# Patient Record
Sex: FEMALE | Race: WHITE | NOT HISPANIC OR LATINO | Employment: OTHER | URBAN - METROPOLITAN AREA
[De-identification: names, ages, dates, MRNs, and addresses within clinical notes are randomized per-mention and may not be internally consistent; named-entity substitution may affect disease eponyms.]

---

## 2021-07-13 ENCOUNTER — CLINICAL SUPPORT (OUTPATIENT)
Dept: URGENT CARE | Facility: CLINIC | Age: 61
End: 2021-07-13
Payer: COMMERCIAL

## 2021-07-13 DIAGNOSIS — Z11.52 ENCOUNTER FOR SCREENING LABORATORY TESTING FOR COVID-19 VIRUS: Primary | ICD-10-CM

## 2021-07-13 LAB
CTP QC/QA: YES
SARS-COV-2 RDRP RESP QL NAA+PROBE: NEGATIVE

## 2021-07-13 PROCEDURE — U0002 COVID-19 LAB TEST NON-CDC: HCPCS | Mod: QW,S$GLB,, | Performed by: PHYSICIAN ASSISTANT

## 2021-07-13 PROCEDURE — U0002: ICD-10-PCS | Mod: QW,S$GLB,, | Performed by: PHYSICIAN ASSISTANT

## 2022-02-22 ENCOUNTER — HOSPITAL ENCOUNTER (INPATIENT)
Facility: HOSPITAL | Age: 62
LOS: 2 days | Discharge: HOME | DRG: 246 | End: 2022-02-24
Attending: EMERGENCY MEDICINE | Admitting: INTERNAL MEDICINE
Payer: COMMERCIAL

## 2022-02-22 DIAGNOSIS — I21.4 NSTEMI (NON-ST ELEVATED MYOCARDIAL INFARCTION): Primary | ICD-10-CM

## 2022-02-22 DIAGNOSIS — R07.9 CHEST PAIN: ICD-10-CM

## 2022-02-22 PROBLEM — R64 CACHEXIA: Chronic | Status: ACTIVE | Noted: 2022-02-22

## 2022-02-22 PROBLEM — F12.20 MARIJUANA DEPENDENCE: Chronic | Status: ACTIVE | Noted: 2022-02-22

## 2022-02-22 PROBLEM — Z72.0 TOBACCO ABUSE: Chronic | Status: ACTIVE | Noted: 2022-02-22

## 2022-02-22 LAB
ALBUMIN SERPL BCP-MCNC: 3.5 G/DL (ref 3.5–5.2)
ALP SERPL-CCNC: 61 U/L (ref 55–135)
ALT SERPL W/O P-5'-P-CCNC: 11 U/L (ref 10–44)
ANION GAP SERPL CALC-SCNC: 11 MMOL/L (ref 8–16)
APTT BLDCRRT: 25.4 SEC (ref 21–32)
AST SERPL-CCNC: 22 U/L (ref 10–40)
BASOPHILS # BLD AUTO: 0.06 K/UL (ref 0–0.2)
BASOPHILS NFR BLD: 0.4 % (ref 0–1.9)
BILIRUB SERPL-MCNC: 0.9 MG/DL (ref 0.1–1)
BNP SERPL-MCNC: 171 PG/ML (ref 0–99)
BUN SERPL-MCNC: 16 MG/DL (ref 8–23)
CALCIUM SERPL-MCNC: 9.7 MG/DL (ref 8.7–10.5)
CHLORIDE SERPL-SCNC: 104 MMOL/L (ref 95–110)
CO2 SERPL-SCNC: 23 MMOL/L (ref 23–29)
CREAT SERPL-MCNC: 0.6 MG/DL (ref 0.5–1.4)
CTP QC/QA: YES
DIFFERENTIAL METHOD: ABNORMAL
EOSINOPHIL # BLD AUTO: 0 K/UL (ref 0–0.5)
EOSINOPHIL NFR BLD: 0.2 % (ref 0–8)
ERYTHROCYTE [DISTWIDTH] IN BLOOD BY AUTOMATED COUNT: 12.5 % (ref 11.5–14.5)
EST. GFR  (AFRICAN AMERICAN): >60 ML/MIN/1.73 M^2
EST. GFR  (NON AFRICAN AMERICAN): >60 ML/MIN/1.73 M^2
GLUCOSE SERPL-MCNC: 142 MG/DL (ref 70–110)
HCT VFR BLD AUTO: 39.3 % (ref 37–48.5)
HGB BLD-MCNC: 13.1 G/DL (ref 12–16)
IMM GRANULOCYTES # BLD AUTO: 0.09 K/UL (ref 0–0.04)
IMM GRANULOCYTES NFR BLD AUTO: 0.6 % (ref 0–0.5)
LIPASE SERPL-CCNC: 35 U/L (ref 4–60)
LYMPHOCYTES # BLD AUTO: 1.2 K/UL (ref 1–4.8)
LYMPHOCYTES NFR BLD: 8.4 % (ref 18–48)
MCH RBC QN AUTO: 29.6 PG (ref 27–31)
MCHC RBC AUTO-ENTMCNC: 33.3 G/DL (ref 32–36)
MCV RBC AUTO: 89 FL (ref 82–98)
MONOCYTES # BLD AUTO: 0.4 K/UL (ref 0.3–1)
MONOCYTES NFR BLD: 2.9 % (ref 4–15)
NEUTROPHILS # BLD AUTO: 12.8 K/UL (ref 1.8–7.7)
NEUTROPHILS NFR BLD: 87.5 % (ref 38–73)
NRBC BLD-RTO: 0 /100 WBC
PLATELET # BLD AUTO: 382 K/UL (ref 150–450)
PMV BLD AUTO: 10 FL (ref 9.2–12.9)
POTASSIUM SERPL-SCNC: 4.3 MMOL/L (ref 3.5–5.1)
PROT SERPL-MCNC: 7.3 G/DL (ref 6–8.4)
RBC # BLD AUTO: 4.42 M/UL (ref 4–5.4)
SARS-COV-2 RDRP RESP QL NAA+PROBE: NEGATIVE
SODIUM SERPL-SCNC: 138 MMOL/L (ref 136–145)
TROPONIN I SERPL DL<=0.01 NG/ML-MCNC: 2.46 NG/ML (ref 0–0.03)
TSH SERPL DL<=0.005 MIU/L-ACNC: 0.93 UIU/ML (ref 0.4–4)
WBC # BLD AUTO: 14.58 K/UL (ref 3.9–12.7)

## 2022-02-22 PROCEDURE — 63600175 PHARM REV CODE 636 W HCPCS: Performed by: INTERNAL MEDICINE

## 2022-02-22 PROCEDURE — 63600175 PHARM REV CODE 636 W HCPCS: Performed by: PHYSICIAN ASSISTANT

## 2022-02-22 PROCEDURE — 25500020 PHARM REV CODE 255: Performed by: INTERNAL MEDICINE

## 2022-02-22 PROCEDURE — 93005 ELECTROCARDIOGRAM TRACING: CPT

## 2022-02-22 PROCEDURE — 25000003 PHARM REV CODE 250: Performed by: INTERNAL MEDICINE

## 2022-02-22 PROCEDURE — 92929 PR STENT, ADD'L VESSEL: ICD-10-PCS | Mod: LC,,, | Performed by: INTERNAL MEDICINE

## 2022-02-22 PROCEDURE — 99291 PR CRITICAL CARE, E/M 30-74 MINUTES: ICD-10-PCS | Mod: CS,,, | Performed by: EMERGENCY MEDICINE

## 2022-02-22 PROCEDURE — C1887 CATHETER, GUIDING: HCPCS | Performed by: INTERNAL MEDICINE

## 2022-02-22 PROCEDURE — 99223 1ST HOSP IP/OBS HIGH 75: CPT | Mod: 25,,, | Performed by: INTERNAL MEDICINE

## 2022-02-22 PROCEDURE — 99291 CRITICAL CARE FIRST HOUR: CPT | Mod: 25

## 2022-02-22 PROCEDURE — 93010 EKG 12-LEAD: ICD-10-PCS | Mod: 76,,, | Performed by: INTERNAL MEDICINE

## 2022-02-22 PROCEDURE — 96374 THER/PROPH/DIAG INJ IV PUSH: CPT

## 2022-02-22 PROCEDURE — 85730 THROMBOPLASTIN TIME PARTIAL: CPT | Performed by: INTERNAL MEDICINE

## 2022-02-22 PROCEDURE — C1874 STENT, COATED/COV W/DEL SYS: HCPCS | Performed by: INTERNAL MEDICINE

## 2022-02-22 PROCEDURE — 93458 L HRT ARTERY/VENTRICLE ANGIO: CPT | Mod: 26,51,59, | Performed by: INTERNAL MEDICINE

## 2022-02-22 PROCEDURE — 99153 MOD SED SAME PHYS/QHP EA: CPT | Performed by: INTERNAL MEDICINE

## 2022-02-22 PROCEDURE — 83690 ASSAY OF LIPASE: CPT | Performed by: EMERGENCY MEDICINE

## 2022-02-22 PROCEDURE — 92929 PR STENT, ADD'L VESSEL: CPT | Mod: LC,,, | Performed by: INTERNAL MEDICINE

## 2022-02-22 PROCEDURE — 92941 PR AMI ANY METHOD: ICD-10-PCS | Mod: LD,,, | Performed by: INTERNAL MEDICINE

## 2022-02-22 PROCEDURE — 93010 ELECTROCARDIOGRAM REPORT: CPT | Mod: ,,, | Performed by: INTERNAL MEDICINE

## 2022-02-22 PROCEDURE — U0002 COVID-19 LAB TEST NON-CDC: HCPCS | Performed by: EMERGENCY MEDICINE

## 2022-02-22 PROCEDURE — C1725 CATH, TRANSLUMIN NON-LASER: HCPCS | Performed by: INTERNAL MEDICINE

## 2022-02-22 PROCEDURE — 93010 EKG 12-LEAD: ICD-10-PCS | Mod: ,,, | Performed by: INTERNAL MEDICINE

## 2022-02-22 PROCEDURE — C9600 PERC DRUG-EL COR STENT SING: HCPCS | Mod: LC | Performed by: INTERNAL MEDICINE

## 2022-02-22 PROCEDURE — 85025 COMPLETE CBC W/AUTO DIFF WBC: CPT | Performed by: EMERGENCY MEDICINE

## 2022-02-22 PROCEDURE — 92941 PRQ TRLML REVSC TOT OCCL AMI: CPT | Mod: LD,,, | Performed by: INTERNAL MEDICINE

## 2022-02-22 PROCEDURE — C9606 PERC D-E COR REVASC W AMI S: HCPCS | Mod: LD | Performed by: INTERNAL MEDICINE

## 2022-02-22 PROCEDURE — 25000003 PHARM REV CODE 250: Performed by: PHYSICIAN ASSISTANT

## 2022-02-22 PROCEDURE — 84484 ASSAY OF TROPONIN QUANT: CPT | Performed by: EMERGENCY MEDICINE

## 2022-02-22 PROCEDURE — 93458 PR CATH PLACE/CORON ANGIO, IMG SUPER/INTERP,W LEFT HEART VENTRICULOGRAPHY: ICD-10-PCS | Mod: 26,51,59, | Performed by: INTERNAL MEDICINE

## 2022-02-22 PROCEDURE — C1769 GUIDE WIRE: HCPCS | Performed by: INTERNAL MEDICINE

## 2022-02-22 PROCEDURE — 36415 COLL VENOUS BLD VENIPUNCTURE: CPT | Performed by: HOSPITALIST

## 2022-02-22 PROCEDURE — 84443 ASSAY THYROID STIM HORMONE: CPT | Performed by: HOSPITALIST

## 2022-02-22 PROCEDURE — 99152 MOD SED SAME PHYS/QHP 5/>YRS: CPT | Performed by: INTERNAL MEDICINE

## 2022-02-22 PROCEDURE — 99223 1ST HOSP IP/OBS HIGH 75: CPT | Mod: ,,, | Performed by: INTERNAL MEDICINE

## 2022-02-22 PROCEDURE — 80053 COMPREHEN METABOLIC PANEL: CPT | Performed by: EMERGENCY MEDICINE

## 2022-02-22 PROCEDURE — 27201423 OPTIME MED/SURG SUP & DEVICES STERILE SUPPLY: Performed by: INTERNAL MEDICINE

## 2022-02-22 PROCEDURE — 93458 L HRT ARTERY/VENTRICLE ANGIO: CPT | Mod: 51,59 | Performed by: INTERNAL MEDICINE

## 2022-02-22 PROCEDURE — 20600001 HC STEP DOWN PRIVATE ROOM

## 2022-02-22 PROCEDURE — 25000242 PHARM REV CODE 250 ALT 637 W/ HCPCS: Performed by: PHYSICIAN ASSISTANT

## 2022-02-22 PROCEDURE — 99223 PR INITIAL HOSPITAL CARE,LEVL III: ICD-10-PCS | Mod: ,,, | Performed by: INTERNAL MEDICINE

## 2022-02-22 PROCEDURE — 99152 MOD SED SAME PHYS/QHP 5/>YRS: CPT | Mod: ,,, | Performed by: INTERNAL MEDICINE

## 2022-02-22 PROCEDURE — 83880 ASSAY OF NATRIURETIC PEPTIDE: CPT | Performed by: EMERGENCY MEDICINE

## 2022-02-22 PROCEDURE — 93010 ELECTROCARDIOGRAM REPORT: CPT | Mod: 76,,, | Performed by: INTERNAL MEDICINE

## 2022-02-22 PROCEDURE — C1894 INTRO/SHEATH, NON-LASER: HCPCS | Performed by: INTERNAL MEDICINE

## 2022-02-22 PROCEDURE — 99152 PR MOD CONSCIOUS SEDATION, SAME PHYS, 5+ YRS, FIRST 15 MIN: ICD-10-PCS | Mod: ,,, | Performed by: INTERNAL MEDICINE

## 2022-02-22 PROCEDURE — 99291 CRITICAL CARE FIRST HOUR: CPT | Mod: CS,,, | Performed by: EMERGENCY MEDICINE

## 2022-02-22 PROCEDURE — 99223 PR INITIAL HOSPITAL CARE,LEVL III: ICD-10-PCS | Mod: 25,,, | Performed by: INTERNAL MEDICINE

## 2022-02-22 DEVICE — STENT RONYX30008UX RESOLUTE ONYX 3.00X08
Type: IMPLANTABLE DEVICE | Site: CORONARY | Status: FUNCTIONAL
Brand: RESOLUTE ONYX™

## 2022-02-22 DEVICE — STENT RONYX40022UX RESOLUTE ONYX 4.00X22
Type: IMPLANTABLE DEVICE | Site: CORONARY | Status: FUNCTIONAL
Brand: RESOLUTE ONYX™

## 2022-02-22 RX ORDER — NITROGLYCERIN 0.4 MG/1
0.4 TABLET SUBLINGUAL
Status: COMPLETED | OUTPATIENT
Start: 2022-02-22 | End: 2022-02-22

## 2022-02-22 RX ORDER — ONDANSETRON 2 MG/ML
INJECTION INTRAMUSCULAR; INTRAVENOUS
Status: DISCONTINUED | OUTPATIENT
Start: 2022-02-22 | End: 2022-02-24 | Stop reason: HOSPADM

## 2022-02-22 RX ORDER — HEPARIN SOD,PORCINE/0.9 % NACL 1000/500ML
INTRAVENOUS SOLUTION INTRAVENOUS
Status: DISCONTINUED | OUTPATIENT
Start: 2022-02-22 | End: 2022-02-24 | Stop reason: HOSPADM

## 2022-02-22 RX ORDER — METOPROLOL TARTRATE 25 MG/1
25 TABLET, FILM COATED ORAL 2 TIMES DAILY
Status: DISCONTINUED | OUTPATIENT
Start: 2022-02-22 | End: 2022-02-24 | Stop reason: HOSPADM

## 2022-02-22 RX ORDER — NITROGLYCERIN 0.4 MG/1
0.4 TABLET SUBLINGUAL EVERY 5 MIN PRN
Status: DISCONTINUED | OUTPATIENT
Start: 2022-02-22 | End: 2022-02-24 | Stop reason: HOSPADM

## 2022-02-22 RX ORDER — NITROGLYCERIN 400 UG/1
SPRAY ORAL
Status: DISCONTINUED | OUTPATIENT
Start: 2022-02-22 | End: 2022-02-24 | Stop reason: HOSPADM

## 2022-02-22 RX ORDER — LOSARTAN POTASSIUM 25 MG/1
25 TABLET ORAL DAILY
Status: DISCONTINUED | OUTPATIENT
Start: 2022-02-22 | End: 2022-02-23

## 2022-02-22 RX ORDER — CLOPIDOGREL 300 MG/1
600 TABLET, FILM COATED ORAL ONCE
Status: COMPLETED | OUTPATIENT
Start: 2022-02-22 | End: 2022-02-22

## 2022-02-22 RX ORDER — SODIUM CHLORIDE 9 MG/ML
INJECTION, SOLUTION INTRAVENOUS ONCE
Status: CANCELLED | OUTPATIENT
Start: 2022-02-22 | End: 2022-02-22

## 2022-02-22 RX ORDER — METOPROLOL TARTRATE 25 MG/1
25 TABLET, FILM COATED ORAL 2 TIMES DAILY
Status: DISCONTINUED | OUTPATIENT
Start: 2022-02-22 | End: 2022-02-23 | Stop reason: HOSPADM

## 2022-02-22 RX ORDER — ONDANSETRON 4 MG/1
8 TABLET, ORALLY DISINTEGRATING ORAL EVERY 8 HOURS PRN
Status: DISCONTINUED | OUTPATIENT
Start: 2022-02-22 | End: 2022-02-23

## 2022-02-22 RX ORDER — NITROGLYCERIN 5 MG/ML
INJECTION, SOLUTION INTRAVENOUS
Status: DISCONTINUED | OUTPATIENT
Start: 2022-02-22 | End: 2022-02-24 | Stop reason: HOSPADM

## 2022-02-22 RX ORDER — ATORVASTATIN CALCIUM 20 MG/1
80 TABLET, FILM COATED ORAL DAILY
Status: DISCONTINUED | OUTPATIENT
Start: 2022-02-23 | End: 2022-02-24 | Stop reason: HOSPADM

## 2022-02-22 RX ORDER — SODIUM CHLORIDE 9 MG/ML
INJECTION, SOLUTION INTRAVENOUS
Status: DISCONTINUED | OUTPATIENT
Start: 2022-02-22 | End: 2022-02-24 | Stop reason: HOSPADM

## 2022-02-22 RX ORDER — NAPROXEN SODIUM 220 MG/1
81 TABLET, FILM COATED ORAL DAILY
Status: DISCONTINUED | OUTPATIENT
Start: 2022-02-23 | End: 2022-02-24 | Stop reason: HOSPADM

## 2022-02-22 RX ORDER — CLOPIDOGREL BISULFATE 75 MG/1
75 TABLET ORAL DAILY
Status: DISCONTINUED | OUTPATIENT
Start: 2022-02-23 | End: 2022-02-24 | Stop reason: HOSPADM

## 2022-02-22 RX ORDER — HEPARIN SODIUM,PORCINE/D5W 25000/250
0-40 INTRAVENOUS SOLUTION INTRAVENOUS CONTINUOUS
Status: DISCONTINUED | OUTPATIENT
Start: 2022-02-22 | End: 2022-02-22

## 2022-02-22 RX ORDER — DIPHENHYDRAMINE HYDROCHLORIDE 50 MG/ML
INJECTION INTRAMUSCULAR; INTRAVENOUS
Status: DISCONTINUED | OUTPATIENT
Start: 2022-02-22 | End: 2022-02-24 | Stop reason: HOSPADM

## 2022-02-22 RX ORDER — HEPARIN SODIUM 1000 [USP'U]/ML
INJECTION, SOLUTION INTRAVENOUS; SUBCUTANEOUS
Status: DISCONTINUED | OUTPATIENT
Start: 2022-02-22 | End: 2022-02-24 | Stop reason: HOSPADM

## 2022-02-22 RX ORDER — DIPHENHYDRAMINE HCL 50 MG
50 CAPSULE ORAL
Status: CANCELLED | OUTPATIENT
Start: 2022-02-22

## 2022-02-22 RX ORDER — LIDOCAINE HYDROCHLORIDE 20 MG/ML
INJECTION, SOLUTION EPIDURAL; INFILTRATION; INTRACAUDAL; PERINEURAL
Status: DISCONTINUED | OUTPATIENT
Start: 2022-02-22 | End: 2022-02-24 | Stop reason: HOSPADM

## 2022-02-22 RX ORDER — SODIUM CHLORIDE 9 MG/ML
INJECTION, SOLUTION INTRAVENOUS CONTINUOUS
Status: ACTIVE | OUTPATIENT
Start: 2022-02-22 | End: 2022-02-22

## 2022-02-22 RX ORDER — MIDAZOLAM HYDROCHLORIDE 1 MG/ML
INJECTION, SOLUTION INTRAMUSCULAR; INTRAVENOUS
Status: DISCONTINUED | OUTPATIENT
Start: 2022-02-22 | End: 2022-02-24 | Stop reason: HOSPADM

## 2022-02-22 RX ORDER — ACETAMINOPHEN 325 MG/1
650 TABLET ORAL EVERY 4 HOURS PRN
Status: DISCONTINUED | OUTPATIENT
Start: 2022-02-22 | End: 2022-02-24 | Stop reason: HOSPADM

## 2022-02-22 RX ORDER — ONDANSETRON 2 MG/ML
4 INJECTION INTRAMUSCULAR; INTRAVENOUS EVERY 6 HOURS PRN
Status: DISCONTINUED | OUTPATIENT
Start: 2022-02-22 | End: 2022-02-24 | Stop reason: HOSPADM

## 2022-02-22 RX ORDER — ATORVASTATIN CALCIUM 40 MG/1
80 TABLET, FILM COATED ORAL ONCE
Status: COMPLETED | OUTPATIENT
Start: 2022-02-22 | End: 2022-02-22

## 2022-02-22 RX ORDER — ONDANSETRON 2 MG/ML
4 INJECTION INTRAMUSCULAR; INTRAVENOUS
Status: COMPLETED | OUTPATIENT
Start: 2022-02-22 | End: 2022-02-22

## 2022-02-22 RX ORDER — NITROGLYCERIN 0.4 MG/1
0.4 TABLET SUBLINGUAL
Status: DISCONTINUED | OUTPATIENT
Start: 2022-02-22 | End: 2022-02-22

## 2022-02-22 RX ORDER — ASPIRIN 325 MG
325 TABLET ORAL
Status: COMPLETED | OUTPATIENT
Start: 2022-02-22 | End: 2022-02-22

## 2022-02-22 RX ORDER — FENTANYL CITRATE 50 UG/ML
INJECTION, SOLUTION INTRAMUSCULAR; INTRAVENOUS
Status: DISCONTINUED | OUTPATIENT
Start: 2022-02-22 | End: 2022-02-24 | Stop reason: HOSPADM

## 2022-02-22 RX ORDER — SODIUM CHLORIDE 0.9 % (FLUSH) 0.9 %
10 SYRINGE (ML) INJECTION
Status: DISCONTINUED | OUTPATIENT
Start: 2022-02-22 | End: 2022-02-24 | Stop reason: HOSPADM

## 2022-02-22 RX ADMIN — CLOPIDOGREL BISULFATE 600 MG: 300 TABLET, FILM COATED ORAL at 08:02

## 2022-02-22 RX ADMIN — ONDANSETRON 4 MG: 2 INJECTION INTRAMUSCULAR; INTRAVENOUS at 08:02

## 2022-02-22 RX ADMIN — NITROGLYCERIN 0.4 MG: 0.4 TABLET, ORALLY DISINTEGRATING SUBLINGUAL at 07:02

## 2022-02-22 RX ADMIN — ONDANSETRON 4 MG: 2 INJECTION INTRAMUSCULAR; INTRAVENOUS at 07:02

## 2022-02-22 RX ADMIN — SODIUM CHLORIDE: 0.9 INJECTION, SOLUTION INTRAVENOUS at 10:02

## 2022-02-22 RX ADMIN — HEPARIN SODIUM 12 UNITS/KG/HR: 5000 INJECTION INTRAVENOUS; SUBCUTANEOUS at 08:02

## 2022-02-22 RX ADMIN — METOPROLOL TARTRATE 25 MG: 25 TABLET, FILM COATED ORAL at 02:02

## 2022-02-22 RX ADMIN — METOPROLOL TARTRATE 25 MG: 25 TABLET, FILM COATED ORAL at 09:02

## 2022-02-22 RX ADMIN — NITROGLYCERIN 0.4 MG: 0.4 TABLET, ORALLY DISINTEGRATING SUBLINGUAL at 08:02

## 2022-02-22 RX ADMIN — ATORVASTATIN CALCIUM 80 MG: 40 TABLET, FILM COATED ORAL at 08:02

## 2022-02-22 RX ADMIN — ACETAMINOPHEN 650 MG: 325 TABLET ORAL at 09:02

## 2022-02-22 RX ADMIN — ASPIRIN 325 MG ORAL TABLET 325 MG: 325 PILL ORAL at 07:02

## 2022-02-22 RX ADMIN — SODIUM CHLORIDE 500 ML: 0.9 INJECTION, SOLUTION INTRAVENOUS at 08:02

## 2022-02-22 NOTE — PLAN OF CARE
Pt arrived to room 328 around 1600. Rt wrist site dressing CDI, no s/s of bleeding. Spouse at bedside. Pt educated on fall risk and remained free from falls/trauma/injury. Denies chest pain, SOB, palpitations, dizziness, pain, or discomfort. Plan of care reviewed with pt, all questions answered. Bed locked in lowest position, call bell within reach, no acute distress noted, will continue to monitor.

## 2022-02-22 NOTE — Clinical Note
The groin and right radial was prepped. The site was prepped with ChloraPrep. The site was clipped. The patient was draped.

## 2022-02-22 NOTE — SUBJECTIVE & OBJECTIVE
Past Medical History:   Diagnosis Date    Tobacco abuse 2022       Past Surgical History:   Procedure Laterality Date     SECTION      HYSTERECTOMY      OOPHORECTOMY      KS REMOVAL OF OVARY/TUBE(S)         Review of patient's allergies indicates:  No Known Allergies    No current facility-administered medications on file prior to encounter.     Current Outpatient Medications on File Prior to Encounter   Medication Sig    alendronate (FOSAMAX) 70 MG tablet Take 1 tablet (70 mg total) by mouth every 7 days.    aspirin 325 MG tablet Take 325 mg by mouth once daily.    HYDROcodone-acetaminophen (NORCO) 5-325 mg per tablet TK 1 T PO  Q 8 H PRN    meloxicam (MOBIC) 7.5 MG tablet TAKE 1 TABLET BY MOUTH DAILY     Family History       Problem Relation (Age of Onset)    Breast cancer Paternal Aunt    Heart attack Father          Tobacco Use    Smoking status: Former Smoker    Smokeless tobacco: Never Used   Substance and Sexual Activity    Alcohol use: Not Currently    Drug use: Yes     Types: Marijuana    Sexual activity: Not on file     Review of Systems   Constitutional: Negative for chills.   HENT:  Negative for congestion.    Eyes:  Negative for blurred vision.   Cardiovascular:  Positive for chest pain. Negative for leg swelling, near-syncope, palpitations and syncope.   Respiratory:  Negative for cough and shortness of breath.    Endocrine: Negative for polyuria.   Skin:  Negative for itching and rash.   Musculoskeletal:  Negative for back pain.   Gastrointestinal:  Positive for nausea and vomiting. Negative for abdominal pain, constipation and diarrhea.   Genitourinary:  Negative for dysuria.   Neurological:  Negative for dizziness, headaches and light-headedness.   Psychiatric/Behavioral:  Negative for altered mental status.    Objective:     Vital Signs (Most Recent):  Temp: 98.7 °F (37.1 °C) (22 0601)  Pulse: 81 (22 0850)  Resp: 20 (22 0850)  BP: 123/80 (22 0850)  SpO2: 99 %  (02/22/22 0850) Vital Signs (24h Range):  Temp:  [98.7 °F (37.1 °C)] 98.7 °F (37.1 °C)  Pulse:  [81-87] 81  Resp:  [20] 20  SpO2:  [98 %-99 %] 99 %  BP: (123-152)/(80-97) 123/80     Weight: 45.4 kg (100 lb)  Body mass index is 15.66 kg/m².    SpO2: 99 %  O2 Device (Oxygen Therapy): room air    No intake or output data in the 24 hours ending 02/22/22 0906    Lines/Drains/Airways       Peripheral Intravenous Line  Duration                  Peripheral IV - Single Lumen 02/22/22 0701 20 G Left Antecubital <1 day                    Physical Exam  Constitutional:       Appearance: She is well-developed.   HENT:      Head: Normocephalic and atraumatic.   Eyes:      Conjunctiva/sclera: Conjunctivae normal.      Pupils: Pupils are equal, round, and reactive to light.   Cardiovascular:      Rate and Rhythm: Normal rate and regular rhythm.      Pulses:           Radial pulses are 2+ on the right side and 2+ on the left side.      Heart sounds: Normal heart sounds, S1 normal and S2 normal. No murmur heard.    No friction rub. No gallop.   Pulmonary:      Effort: Pulmonary effort is normal. No respiratory distress.      Breath sounds: Normal breath sounds. No wheezing or rales.   Chest:      Chest wall: No tenderness.   Abdominal:      General: Bowel sounds are normal. There is no distension.      Palpations: Abdomen is soft. There is no mass.      Tenderness: There is no abdominal tenderness. There is no guarding or rebound.   Musculoskeletal:      Cervical back: Normal range of motion and neck supple.   Skin:     General: Skin is warm and dry.   Neurological:      Mental Status: She is alert and oriented to person, place, and time.       Significant Labs: BMP:   Recent Labs   Lab 02/22/22  0659   *      K 4.3      CO2 23   BUN 16   CREATININE 0.6   CALCIUM 9.7   , CMP   Recent Labs   Lab 02/22/22  0659      K 4.3      CO2 23   *   BUN 16   CREATININE 0.6   CALCIUM 9.7   PROT 7.3   ALBUMIN  3.5   BILITOT 0.9   ALKPHOS 61   AST 22   ALT 11   ANIONGAP 11   ESTGFRAFRICA >60.0   EGFRNONAA >60.0   , CBC   Recent Labs   Lab 02/22/22  0659   WBC 14.58*   HGB 13.1   HCT 39.3      , INR No results for input(s): INR, PROTIME in the last 48 hours., Lipid Panel No results for input(s): CHOL, HDL, LDLCALC, TRIG, CHOLHDL in the last 48 hours., Troponin   Recent Labs   Lab 02/22/22  0659   TROPONINI 2.459*   , and All pertinent lab results from the last 24 hours have been reviewed.    Significant Imaging: CT scan: CT ABDOMEN PELVIS WITH CONTRAST: No results found for this visit on 02/22/22. and CT ABDOMEN PELVIS WITHOUT CONTRAST: No results found for this visit on 02/22/22., Echocardiogram: 2D echo with color flow doppler: No results found for this or any previous visit., and X-Ray: CXR: X-Ray Chest 1 View (CXR): No results found for this visit on 02/22/22.

## 2022-02-22 NOTE — CONSULTS
[facility], [place]  Interventional Cardiology Consult      Mirtha Jewell  YOB: 1960  Medical Record Number:  [id number]  Attending Physician:  No att. providers found   Date of Admission: 2/22/2022       Hospital Day:  0  Current Principal Problem:  <principal problem not specified>      History     Cc: epigastric pain, nausea/vomiting      HPI  Ms Mirtha Jewell is a 61 year old female with PMH of osteoporosis who presents to [facility] with epigastric and substernal chest pain x3 days. Describes upper abdomen/substernal pressure like discomfort intermittently for the past 3 days. States the pain comes and goes and typically is worse when she is at work and on her feet. Initially improved with rest but earlier this morning developed the same discomfort at rest. She has had associated nausea and threw up a few times, nonbloody, nonbilious. She reports some diaphoresis last night. No fevers at home. No sick contacts, no one with similar GI symptoms in the home. She is a smoker and has a significant family history for coronary disease. Denies any personal cardiac history. She has never had a heart cath or a stress test. No history of bleeding.     On presentation she was hemodynamically stable, satting 98% on room air. Labs notable for initial troponin of 2.5, hemoglobin 13.1 and creatinine 0.6. EKG sinus rhythm with small septal Q waves, generalized T wave flattening, no ST depressions or elevations. Her pain was initially 8/10 but improved somewhat with sublingual nitroglycerin. She received 3 doses and reports improvement to 2/10 but not complete resolution.      Medications - Outpatient  Prior to Admission medications    Medication Sig Start Date End Date Taking? Authorizing Provider   alendronate (FOSAMAX) 70 MG tablet Take 1 tablet (70 mg total) by mouth every 7 days. 5/1/19 4/30/20  Daniel Floyd MD   aspirin 325 MG tablet Take 325 mg by mouth once daily.    Historical Provider    HYDROcodone-acetaminophen (NORCO) 5-325 mg per tablet TK 1 T PO  Q 8 H PRN 3/4/19   Historical Provider   meloxicam (MOBIC) 7.5 MG tablet TAKE 1 TABLET BY MOUTH DAILY 2/15/19   Historical Provider         Medications - Current  Scheduled Meds:   atorvastatin  80 mg Oral Once    clopidogreL  600 mg Oral Once    heparin (PORCINE)  60 Units/kg (Adjusted) Intravenous Once    sodium chloride 0.9%  500 mL Intravenous ED 1 Time     Continuous Infusions:   heparin (porcine) in D5W       PRN Meds:.heparin (PORCINE), heparin (PORCINE)      Allergies  Review of patient's allergies indicates:  No Known Allergies      Past Medical History  No past medical history on file.      Past Surgical History  Past Surgical History:   Procedure Laterality Date     SECTION      HYSTERECTOMY      OOPHORECTOMY      HI REMOVAL OF OVARY/TUBE(S)           Social History  Social History     Socioeconomic History    Marital status:    Tobacco Use    Smoking status: Former Smoker    Smokeless tobacco: Never Used         ROS  10 point ROS performed and negative except as stated in HPI       Physical Examination         Vital Signs  Vitals  Temp: 98.7 °F (37.1 °C)  Temp src: Oral  Pulse: 86  Resp: 20  SpO2: 98 %  O2 Device (Oxygen Therapy): room air  BP: 134/86  BP Location: Left arm  BP Method: Automatic  Patient Position: Sitting          24 Hour VS Range    Temp:  [98.7 °F (37.1 °C)]   Pulse:  [86-87]   Resp:  [20]   BP: (134-152)/(86-97)   SpO2:  [98 %]   No intake or output data in the 24 hours ending 22 0805      Physical Exam:   Constitutional: uncomfortable appearing, tearful   HEENT: NCAT, EOMI, no scleral icterus  Cardiovascular: Regular rate and rhythm, no murmurs appreciated. 2+ carotid, radial, femoral pulses bilaterally    Pulmonary: Clear to auscultation bilaterally   Abdomen: nontender, non-distended   Neuro: alert and oriented, no focal deficits  Extremities: warm, no edema   MSK: no  deformities  Integument: intact, no rashes       Data       Recent Labs   Lab 02/22/22  0659   WBC 14.58*   HGB 13.1   HCT 39.3           No results for input(s): PROTIME, INR in the last 168 hours.     Recent Labs   Lab 02/22/22  0659      K 4.3      CO2 23   BUN 16   CREATININE 0.6   ANIONGAP 11   CALCIUM 9.7        Recent Labs   Lab 02/22/22  0659   PROT 7.3   ALBUMIN 3.5   BILITOT 0.9   ALKPHOS 61   AST 22   ALT 11        Recent Labs   Lab 02/22/22  0659   TROPONINI 2.459*        No results found for: BNP    No results for input(s): LABBLOO in the last 168 hours.       ECG: NSR   Generalized T wave flattening   No ST depressions or elevations         Assessment & Plan   61 year old female with PMH of osteoporosis who presents to [facility] with epigastric and substernal chest pain x3 days. Associated with nausea/vomiting, diaphoresis. Troponin elevated to 2.5. Nonspecific EKG changes.     NSTEMI:   Given concerning symptoms and positive biomarkers, will proceed with left heart cath. Received , Plavix 600 and initiated on heparin gtt in ED.   --LHC +/- PCI, patient is a HELENA candidate  - Anti-platelet Therapy: ASA / Clopidogrel   - Access: Right radial  - Creatinine/CrCl: 0.6  - Allergies: No shellfish / Iodine allergy  - Pre-Hydration: NS  - Pre-Op Med: Bendaryl 50mg PO   - All patient's questions were answered.  -The risks, benefits and alternatives of the procedure were explained to the patient.   -The risks of coronary angiography include but are not limited to: bleeding, infection, heart rhythm abnormalities, allergic reactions, kidney injury and potential need for dialysis, stroke and death.   - Should stenting be indicated, the patient has agreed to dual anti-platelet therapy for 1-consecutive year with a drug-eluting stent and a minimum of 1-month with the use of a bare metal stent  - Additionally, pt is aware that non-compliance is likely to result in stent clotting with heart attack,  heart failure, and/or death  -The risks of moderate sedation include hypotension, respiratory depression, arrhythmias, bronchospasm, and death.   - Informed consent was obtained and the  patient is agreeable to proceed with the procedure.        Siva Monroy MD  [facility]   Cardiovascular Disease PGY-IV

## 2022-02-22 NOTE — ASSESSMENT & PLAN NOTE
- NSTEMI   - Admit to hospital medicine  - Start ACS protocol heparin drip  - Trend Troponin  - ASA 325mg x1, followed by 81mg  - Plavix 600mg x1, followed by 75mg  - Start high intensity statin Lipitor 40-80 or Crestor 20-40mg qhs  - NPO  - Monitor on telemetry  - 2d Echo  - Start low dose Metoprolol 25mg po BID, uptitrate to keep HR ~60  - A1c, TSH, Lipid profile   - Closely monitor BP, goal SBP <120  - Consult Interventional cardiology

## 2022-02-22 NOTE — ASSESSMENT & PLAN NOTE
Patient had prox to mid LAD lesion and mid circumflex lesion requiring PCI with 2 drug-eluting stents.  Patient is on aspirin, Plavix.  Continue aspirin indefinitely and Plavix for 6 months but ideally up to 1 year if no risk bleeding  Continue Lipitor 80 mg.  Blood pressure control metoprolol and losartan  Will order echo.  Cardiac rehab

## 2022-02-22 NOTE — H&P
[facility] - Cardiology Stepdown  Interventional Cardiology  H&P    Patient Name: Mirtha Jewell  MRN: [id number]  Admission Date: 2022  Code Status: Full Code   Attending Provider: No att. providers found   Primary Care Physician: Primary Doctor No  Principal Problem:NSTEMI (non-ST elevated myocardial infarction)    Patient information was obtained from patient and ER records.     Subjective:     Chief Complaint:  Chest pain     HPI:  Ms Mirtha Jewell is a 61 year old female with PMH of osteoporosis who presents to [facility] with epigastric and substernal chest pain x3 days. Describes upper abdomen/substernal pressure like discomfort intermittently for the past 3 days. States the pain comes and goes and typically is worse when she is at work and on her feet. Initially improved with rest but earlier this morning developed the same discomfort at rest. She has had associated nausea and threw up a few times, nonbloody, nonbilious. She reports some diaphoresis last night   On presentation EKG sinus rhythm with small septal Q waves, generalized T wave flattening, no ST depressions or elevations. Her pain was initially 8/10 but improved somewhat with sublingual nitroglycerin. She received 3 doses and reports improvement to 2/10 but not complete resolution.  She also has a troponin of 2.5.  She was taken immediately to cath lab from the emergency room.  She was found to have proximal LAD and mid circumflex lesions for which she underwent PCI with drug-eluting stents placement.  Currently she is chest pain-free denies having any nausea vomiting..  Post procedure she received 100 cc fluids for 4 hours. Her radial site looks good and warm to touch with good pulse       Past Medical History:   Diagnosis Date    Tobacco abuse 2022       Past Surgical History:   Procedure Laterality Date     SECTION      HYSTERECTOMY      OOPHORECTOMY      MT REMOVAL OF OVARY/TUBE(S)         Review of patient's allergies  indicates:  No Known Allergies    PTA Medications   Medication Sig    alendronate (FOSAMAX) 70 MG tablet Take 1 tablet (70 mg total) by mouth every 7 days.    aspirin 325 MG tablet Take 325 mg by mouth once daily.    HYDROcodone-acetaminophen (NORCO) 5-325 mg per tablet TK 1 T PO  Q 8 H PRN    meloxicam (MOBIC) 7.5 MG tablet TAKE 1 TABLET BY MOUTH DAILY     Family History       Problem Relation (Age of Onset)    Breast cancer Paternal Aunt    Heart attack Father          Tobacco Use    Smoking status: Former Smoker    Smokeless tobacco: Never Used   Substance and Sexual Activity    Alcohol use: Not Currently    Drug use: Yes     Types: Marijuana    Sexual activity: Not on file     Review of Systems   Constitutional: Negative.   HENT: Negative.     Eyes: Negative.    Cardiovascular:  Positive for chest pain.   Respiratory:  Positive for shortness of breath.    Endocrine: Negative.    Gastrointestinal:  Positive for bloating, nausea and vomiting.   Genitourinary: Negative.    Neurological: Negative.    Objective:     Vital Signs (Most Recent):  Temp: 97.6 °F (36.4 °C) (02/22/22 1600)  Pulse: 92 (02/22/22 1600)  Resp: 18 (02/22/22 1600)  BP: 105/78 (02/22/22 1600)  SpO2: 95 % (02/22/22 1600) Vital Signs (24h Range):  Temp:  [97.6 °F (36.4 °C)-98.7 °F (37.1 °C)] 97.6 °F (36.4 °C)  Pulse:  [81-92] 92  Resp:  [11-44] 18  SpO2:  [94 %-99 %] 95 %  BP: (105-152)/() 105/78     Weight: 45.4 kg (100 lb)  Body mass index is 15.66 kg/m².    SpO2: 95 %  O2 Device (Oxygen Therapy): room air    No intake or output data in the 24 hours ending 02/22/22 1631    Lines/Drains/Airways       Peripheral Intravenous Line  Duration                  Peripheral IV - Single Lumen 02/22/22 0701 20 G Left Antecubital <1 day                    Physical Exam  Constitutional:       Appearance: Normal appearance.   HENT:      Head: Normocephalic.      Nose: Nose normal.   Eyes:      Extraocular Movements: Extraocular movements intact.       Pupils: Pupils are equal, round, and reactive to light.   Cardiovascular:      Rate and Rhythm: Normal rate and regular rhythm.   Pulmonary:      Effort: Pulmonary effort is normal.      Breath sounds: Normal breath sounds.   Abdominal:      General: Abdomen is flat. Bowel sounds are normal.      Palpations: Abdomen is soft.   Musculoskeletal:      Cervical back: Normal range of motion.   Skin:     General: Skin is warm.      Capillary Refill: Capillary refill takes less than 2 seconds.   Neurological:      General: No focal deficit present.      Mental Status: She is alert and oriented to person, place, and time.       Significant Labs: All pertinent lab results from the last 24 hours have been reviewed.         Assessment and Plan:     * NSTEMI (non-ST elevated myocardial infarction)  Patient had prox to mid LAD lesion and mid circumflex lesion requiring PCI with 2 drug-eluting stents.  Patient is on aspirin, Plavix.  Continue aspirin indefinitely and Plavix for 6 months but ideally up to 1 year if no risk bleeding  Continue Lipitor 80 mg.  Blood pressure control metoprolol and losartan  Will order echo,check lipid panel and TSH   Cardiac rehab        H/O Vaping  She currently does vaping and pt counseled on cessation.         VTE Risk Mitigation (From admission, onward)         Ordered     heparin (porcine) injection  As needed (PRN)         02/22/22 0933     heparin (porcine) 750 Units, verapamiL 1.25 mg, nitroGLYCERIN 1.25 mg in sodium chloride 0.9% 250 mL  As needed (PRN)         02/22/22 0934     heparin infusion 1,000 units/500 ml in 0.9% NaCl (on sterile field)  As needed (PRN)         02/22/22 0934                Orlin Rahman MD  Interventional Cardiology   [facility]

## 2022-02-22 NOTE — HPI
Patient is a 61-year-old female with coronary risk factors of 20 pack-year smoking history and continues to vape.  She also notes daily marijuana abuse.  No prior cardiac history.  She does have a family history of her father having MI in his 40s.  Patient presents with 3 days of epigastric and substernal chest discomfort.  She describes her discomfort as a pressure in her chest that is constant.  Her pain is unaffected by ambulation.  She never had this pain before.  No prior stress testing.  In the ED noted to have a troponin elevated to 2. She was given 3 sublingual nitroglycerines which improved her chest pain.  Started on aspirin, Plavix, heparin drip.  Consult Interventional Cardiology for consideration for angiogram for NSTEMI.

## 2022-02-22 NOTE — BRIEF OP NOTE
Brief Operative Note:    : Aj Gee MD     Referring Physician: Self,Aaareferral     All Operators: Surgeon(s):  MD Damaso Holt MD Barret Murphy, MD     Preoperative Diagnosis: NSTEMI (non-ST elevated myocardial infarction) [I21.4]     Postop Diagnosis: NSTEMI (non-ST elevated myocardial infarction) [I21.4]    Treatments/Procedures: Procedure(s) (LRB):  Left heart cath (Left)  Stent, Drug Eluting, Multi Vessel, Coronary    Access: Right radial artery    Findings:Severe coronary artery disease is present.  LVEDP 30 mmHg.     See catheterization report for full details.    Intervention: HELENA to prox LAD and mid LCx    See catheterization report for full details.    Closure device: Vasc Band       Plan:  - Post cath protocol   - IVF @ 100 cc/kg/hr x 4 hours  - Continue aspirin 81 mg daily indefinitely  - Continue clopidogrel for minimum 6 months, ideally up to 1 year  - Continue high intensity statin therapy (LDL goal < 70)  - Risk factor reduction (BP <130/80 mmHg, glycemic control, etc)  - Cardiac rehab referral  - Follow up with outpatient cardiologist    Estimated Blood loss: 20 cc    Specimens removed: No    Siva Monroy MD  [facility]  Cardiovascular Disease, PGY-IV

## 2022-02-22 NOTE — CONSULTS
[facility] - Emergency Dept  Cardiology  Consult Note    Patient Name: Mirtha Jewell  MRN: [id number]  Admission Date: 2022  Hospital Length of Stay: 0 days  Code Status: Full Code   Attending Provider: Gabriele Roberts MD   Consulting Provider: Riki Harding MD  Primary Care Physician: Primary Doctor No  Principal Problem:NSTEMI (non-ST elevated myocardial infarction)    Patient information was obtained from patient and past medical records.     Inpatient consult to Cardiology  Consult performed by: Riki Harding MD  Consult ordered by: Casie Langford PA-C        Subjective:     Chief Complaint:  Chest pain     HPI:   Patient is a 61-year-old female with coronary risk factors of 20 pack-year smoking history and continues to vape.  She also notes daily marijuana abuse.  No prior cardiac history.  She does have a family history of her father having MI in his 40s.  Patient presents with 3 days of epigastric and substernal chest discomfort.  She describes her discomfort as a pressure in her chest that is constant.  Her pain is unaffected by ambulation.  She never had this pain before.  No prior stress testing.  In the ED noted to have a troponin elevated to 2. She was given 3 sublingual nitroglycerines which improved her chest pain.  Started on aspirin, Plavix, heparin drip.  Consult Interventional Cardiology for consideration for angiogram for NSTEMI.       Past Medical History:   Diagnosis Date    Tobacco abuse 2022       Past Surgical History:   Procedure Laterality Date     SECTION      HYSTERECTOMY      OOPHORECTOMY      [place] REMOVAL OF OVARY/TUBE(S)         Review of patient's allergies indicates:  No Known Allergies    No current facility-administered medications on file prior to encounter.     Current Outpatient Medications on File Prior to Encounter   Medication Sig    alendronate (FOSAMAX) 70 MG tablet Take 1 tablet (70 mg total) by mouth every 7 days.    aspirin 325 MG tablet Take  325 mg by mouth once daily.    HYDROcodone-acetaminophen (NORCO) 5-325 mg per tablet TK 1 T PO  Q 8 H PRN    meloxicam (MOBIC) 7.5 MG tablet TAKE 1 TABLET BY MOUTH DAILY     Family History       Problem Relation (Age of Onset)    Breast cancer Paternal Aunt    Heart attack Father          Tobacco Use    Smoking status: Former Smoker    Smokeless tobacco: Never Used   Substance and Sexual Activity    Alcohol use: Not Currently    Drug use: Yes     Types: Marijuana    Sexual activity: Not on file     Review of Systems   Constitutional: Negative for chills.   HENT:  Negative for congestion.    Eyes:  Negative for blurred vision.   Cardiovascular:  Positive for chest pain. Negative for leg swelling, near-syncope, palpitations and syncope.   Respiratory:  Negative for cough and shortness of breath.    Endocrine: Negative for polyuria.   Skin:  Negative for itching and rash.   Musculoskeletal:  Negative for back pain.   Gastrointestinal:  Positive for nausea and vomiting. Negative for abdominal pain, constipation and diarrhea.   Genitourinary:  Negative for dysuria.   Neurological:  Negative for dizziness, headaches and light-headedness.   Psychiatric/Behavioral:  Negative for altered mental status.    Objective:     Vital Signs (Most Recent):  Temp: 98.7 °F (37.1 °C) (02/22/22 0601)  Pulse: 81 (02/22/22 0850)  Resp: 20 (02/22/22 0850)  BP: 123/80 (02/22/22 0850)  SpO2: 99 % (02/22/22 0850) Vital Signs (24h Range):  Temp:  [98.7 °F (37.1 °C)] 98.7 °F (37.1 °C)  Pulse:  [81-87] 81  Resp:  [20] 20  SpO2:  [98 %-99 %] 99 %  BP: (123-152)/(80-97) 123/80     Weight: 45.4 kg (100 lb)  Body mass index is 15.66 kg/m².    SpO2: 99 %  O2 Device (Oxygen Therapy): room air    No intake or output data in the 24 hours ending 02/22/22 0906    Lines/Drains/Airways       Peripheral Intravenous Line  Duration                  Peripheral IV - Single Lumen 02/22/22 0701 20 G Left Antecubital <1 day                    Physical  Exam  Constitutional:       Appearance: She is well-developed.   HENT:      Head: Normocephalic and atraumatic.   Eyes:      Conjunctiva/sclera: Conjunctivae normal.      Pupils: Pupils are equal, round, and reactive to light.   Cardiovascular:      Rate and Rhythm: Normal rate and regular rhythm.      Pulses:           Radial pulses are 2+ on the right side and 2+ on the left side.      Heart sounds: Normal heart sounds, S1 normal and S2 normal. No murmur heard.    No friction rub. No gallop.   Pulmonary:      Effort: Pulmonary effort is normal. No respiratory distress.      Breath sounds: Normal breath sounds. No wheezing or rales.   Chest:      Chest wall: No tenderness.   Abdominal:      General: Bowel sounds are normal. There is no distension.      Palpations: Abdomen is soft. There is no mass.      Tenderness: There is no abdominal tenderness. There is no guarding or rebound.   Musculoskeletal:      Cervical back: Normal range of motion and neck supple.   Skin:     General: Skin is warm and dry.   Neurological:      Mental Status: She is alert and oriented to person, place, and time.       Significant Labs: BMP:   Recent Labs   Lab 02/22/22  0659   *      K 4.3      CO2 23   BUN 16   CREATININE 0.6   CALCIUM 9.7   , CMP   Recent Labs   Lab 02/22/22  0659      K 4.3      CO2 23   *   BUN 16   CREATININE 0.6   CALCIUM 9.7   PROT 7.3   ALBUMIN 3.5   BILITOT 0.9   ALKPHOS 61   AST 22   ALT 11   ANIONGAP 11   ESTGFRAFRICA >60.0   EGFRNONAA >60.0   , CBC   Recent Labs   Lab 02/22/22  0659   WBC 14.58*   HGB 13.1   HCT 39.3      , INR No results for input(s): INR, PROTIME in the last 48 hours., Lipid Panel No results for input(s): CHOL, HDL, LDLCALC, TRIG, CHOLHDL in the last 48 hours., Troponin   Recent Labs   Lab 02/22/22  0659   TROPONINI 2.459*   , and All pertinent lab results from the last 24 hours have been reviewed.    Significant Imaging: CT scan: CT ABDOMEN  PELVIS WITH CONTRAST: No results found for this visit on 02/22/22. and CT ABDOMEN PELVIS WITHOUT CONTRAST: No results found for this visit on 02/22/22., Echocardiogram: 2D echo with color flow doppler: No results found for this or any previous visit., and X-Ray: CXR: X-Ray Chest 1 View (CXR): No results found for this visit on 02/22/22.    Assessment and Plan:     * NSTEMI (non-ST elevated myocardial infarction)  - NSTEMI   - Admit to hospital medicine  - Start ACS protocol heparin drip  - Trend Troponin  - ASA 325mg x1, followed by 81mg  - Plavix 600mg x1, followed by 75mg  - Start high intensity statin Lipitor 40-80 or Crestor 20-40mg qhs  - NPO  - Monitor on telemetry  - 2d Echo  - Start low dose Metoprolol 25mg po BID, uptitrate to keep HR ~60  - A1c, TSH, Lipid profile   - Closely monitor BP, goal SBP <120  - Consult Interventional cardiology        Marijuana dependence  -daily marijuana use  encourage cessation    Cachexia  -chronic  - consider GI evaluation, nutrition consult    Tobacco abuse  Prior tobacco use, 20 years        VTE Risk Mitigation (From admission, onward)         Ordered     heparin 25,000 units in dextrose 5% (100 units/ml) IV bolus from bag - ADDITIONAL PRN BOLUS - 60 units/kg (max bolus 4000 units)  As needed (PRN)        Question:  Heparin Infusion Adjustment (DO NOT MODIFY ANSWER)  Answer:  \\[facility]\Images\Pharmacy\HeparinInfusions\heparin LOW INTENSITY nomogram for [facility] [email or web address]    02/22/22 0804     heparin 25,000 units in dextrose 5% (100 units/ml) IV bolus from bag - ADDITIONAL PRN BOLUS - 30 units/kg (max bolus 4000 units)  As needed (PRN)        Question:  Heparin Infusion Adjustment (DO NOT MODIFY ANSWER)  Answer:  \\[facility]\Images\Pharmacy\HeparinInfusions\heparin LOW INTENSITY nomogram for [facility] [id number]    02/22/22 0804     heparin 25,000 units in dextrose 5% 250 mL (100 units/mL) infusion LOW INTENSITY nomogram - [facility]  Continuous        Question Answer  Comment   Heparin Infusion Adjustment (DO NOT MODIFY ANSWER) \\[email or web address]\Images\Pharmacy\HeparinInfusions\heparin LOW INTENSITY nomogram for [facility] [id number]    Begin at (in units/kg/hr) 12        02/22/22 0804                Thank you for your consult. I will follow-up with patient. Please contact us if you have any additional questions.    Riki Harding MD  Cardiology   [facility] - Emergency Dept

## 2022-02-22 NOTE — ED PROVIDER NOTES
Encounter Date: 2022       History     Chief Complaint   Patient presents with    Abdominal Pain     Epigastric pain that radiates to center chest, +N/V x 3 days. Denies sob states decrease in oral intake over the past week. Pt tearful in triage     61 y.o. female with significant past medical history of chronic back pain, 10 pack year history of tobacco abuse (quit cigarettes , currently vaping), daily marijuana use presented to the ER complaining of chest pain and epigastric pain x 3 days.  Chest pain located mid sternum.  Chest pain described as heaviness, pressure.  Pt reports chest pain aggravated by emotional stress and large meals.  Chest pain alleviated by vomiting.  The pain is not worse with exertion and pt denies SOB with exertion.  Pt reports 1 episode of diarrhea evening prior to arrival and took pepto x 1.  No recent antibiotic use.  Chest pain is not reproducible.  Chest pain initially intermittent but became constant since last night.  The pain is not radiating.  Pt reports significant stress with her job and family (son in intermediate).  Pt denies SOB, diaphoresis, or palpitations.  Pt reports this has never happened before, she has never had a stress test.  She reports her father  at age 48 from cardiac complications.  Pt denies fever, chills, SOB, cough, sore throat, congestion, blood in stool, melena, leg swelling or pain, fatigue, weakness, confusion, syncope.  Pt reports she is vaccinated against COVID, did not receive booster.  Exposure via  who tested positive a few weeks ago.            Review of patient's allergies indicates:  No Known Allergies  Past Medical History:   Diagnosis Date    Tobacco abuse 2022     Past Surgical History:   Procedure Laterality Date     SECTION      HYSTERECTOMY      OOPHORECTOMY      [place] REMOVAL OF OVARY/TUBE(S)       Family History   Problem Relation Age of Onset    Heart attack Father     Breast cancer Paternal Aunt      Social  History     Tobacco Use    Smoking status: Former Smoker    Smokeless tobacco: Never Used   Substance Use Topics    Alcohol use: Not Currently    Drug use: Yes     Types: Marijuana     Review of Systems   Constitutional: Positive for appetite change. Negative for chills, diaphoresis, fatigue, fever and unexpected weight change.   HENT: Negative for congestion, trouble swallowing and voice change.    Eyes: Negative for photophobia and visual disturbance.   Respiratory: Negative for cough, chest tightness, shortness of breath and wheezing.    Cardiovascular: Positive for chest pain. Negative for palpitations and leg swelling.   Gastrointestinal: Positive for nausea and vomiting. Negative for abdominal pain, blood in stool, constipation and diarrhea.   Endocrine: Negative for polydipsia, polyphagia and polyuria.   Genitourinary: Negative for dysuria and frequency.   Musculoskeletal: Negative for arthralgias and myalgias.   Skin: Negative for rash and wound.   Neurological: Negative for dizziness, tremors, syncope, facial asymmetry, speech difficulty, weakness, light-headedness and headaches.   Hematological: Negative for adenopathy. Does not bruise/bleed easily.   Psychiatric/Behavioral: Negative for confusion and dysphoric mood. The patient is not nervous/anxious.        Physical Exam     Initial Vitals [02/22/22 0601]   BP Pulse Resp Temp SpO2   (!) 152/97 87 20 98.7 °F (37.1 °C) 98 %      MAP       --         Physical Exam    Nursing note and vitals reviewed.  Constitutional: She appears well-developed and well-nourished. She is not diaphoretic. No distress.   HENT:   Head: Normocephalic and atraumatic.   Right Ear: External ear normal.   Left Ear: External ear normal.   Eyes: Conjunctivae and EOM are normal. Pupils are equal, round, and reactive to light.   Neck: Neck supple.   Normal range of motion.  Cardiovascular: Normal rate and regular rhythm.   Pulmonary/Chest: Breath sounds normal. No respiratory  distress. She has no wheezes.   Abdominal: Abdomen is soft. Bowel sounds are normal. She exhibits no distension. There is no abdominal tenderness. There is no rebound and no guarding.   Musculoskeletal:         General: No tenderness or edema. Normal range of motion.      Cervical back: Normal range of motion and neck supple.     Lymphadenopathy:     She has no cervical adenopathy.   Neurological: She is alert and oriented to person, place, and time.   Skin: Skin is warm and dry.   Psychiatric: She has a normal mood and affect.         ED Course   Procedures  Labs Reviewed   CBC W/ AUTO DIFFERENTIAL - Abnormal; Notable for the following components:       Result Value    WBC 14.58 (*)     Immature Granulocytes 0.6 (*)     Gran # (ANC) 12.8 (*)     Immature Grans (Abs) 0.09 (*)     Gran % 87.5 (*)     Lymph % 8.4 (*)     Mono % 2.9 (*)     All other components within normal limits   COMPREHENSIVE METABOLIC PANEL - Abnormal; Notable for the following components:    Glucose 142 (*)     All other components within normal limits   TROPONIN I - Abnormal; Notable for the following components:    Troponin I 2.459 (*)     All other components within normal limits   LIPASE   B-TYPE NATRIURETIC PEPTIDE   APTT   TROPONIN I   PROTIME-INR   SARS-COV-2 RDRP GENE   TYPE & SCREEN        ECG Results          EKG 12-lead (Final result)  Result time 02/22/22 09:34:42    Final result by Interface, Lab In [facility] (02/22/22 09:34:42)                 Narrative:    Test Reason : R07.9,    Vent. Rate : 085 BPM     Atrial Rate : 085 BPM     P-R Int : 156 ms          QRS Dur : 066 ms      QT Int : 422 ms       P-R-T Axes : 076 033 085 degrees     QTc Int : 502 ms    Normal sinus rhythm with sinus arrhythmia  Low septal forces  Abnormal ECG  When compared with ECG of 22-FEB-2022 08:20,  No significant change was found  Confirmed by Jim OCAMPO MD ([id number]) on 2/22/2022 9:34:28 AM    Referred By: [email or web address]   SELF           Confirmed By:Jim OCAMPO MD                              EKG 12-lead (Final result)  Result time 02/22/22 10:02:17    Final result by Interface, Lab In [facility] (02/22/22 10:02:17)                 Narrative:    Test Reason : R07.9,    Vent. Rate : 070 BPM     Atrial Rate : 085 BPM     P-R Int : 148 ms          QRS Dur : 070 ms      QT Int : 308 ms       P-R-T Axes : 092 -26 064 degrees     QTc Int : 332 ms    Sinus rhythm with marked sinus arrythmia  Low septal forces  Abnormal ECG  No previous ECGs available  Confirmed by Jim OCAMPO MD ([id number]) on 2/22/2022 10:02:07 AM    Referred By: [email or web address]   SELF           Confirmed By:Jim OCAMPO MD                            Imaging Results    None          Medications   heparin 25,000 units in dextrose 5% 250 mL (100 units/mL) infusion LOW INTENSITY nomogram - [facility] (12 Units/kg/hr × 45.4 kg (Adjusted) Intravenous New Bag 2/22/22 0848)   heparin 25,000 units in dextrose 5% (100 units/ml) IV bolus from bag - ADDITIONAL PRN BOLUS - 60 units/kg (max bolus 4000 units) (has no administration in time range)   heparin 25,000 units in dextrose 5% (100 units/ml) IV bolus from bag - ADDITIONAL PRN BOLUS - 30 units/kg (max bolus 4000 units) (has no administration in time range)   nitroGLYCERIN SL tablet 0.4 mg (0.4 mg Sublingual Given 2/22/22 0821)   sodium chloride 0.9% flush 10 mL (has no administration in time range)   fentaNYL 50 mcg/mL injection (50 mcg Intravenous Given 2/22/22 0953)   midazolam injection (1 mg Intravenous Given 2/22/22 0953)   heparin (porcine) injection (8,000 Units Intravenous Given 2/22/22 0927)   LIDOcaine (PF) 20 mg/ml (2%) injection (20 mLs  Given 2/22/22 0925)   heparin (porcine) 750 Units, verapamiL 1.25 mg, nitroGLYCERIN 1.25 mg in sodium chloride 0.9% 250 mL (  Given 2/22/22 0925)   heparin infusion 1,000 units/500 ml in 0.9% NaCl (on sterile field) (1,500 mLs  Given 2/22/22 0925)   0.9%  NaCl infusion (125 mL/hr Intravenous New Bag 2/22/22 0934)   ondansetron injection (4 mg  Intravenous Given 2/22/22 0944)   nitroGLYCERIN 0.4 MG/DOSE TL SPRY 400 mcg/spray spray (2 sprays Sublingual Given 2/22/22 0945)   diphenhydrAMINE injection (50 mg Intravenous Given 2/22/22 0927)   nitroGLYCERIN injection (200 mcg Intra-arterial Given 2/22/22 0950)   ondansetron injection 4 mg (4 mg Intravenous Given 2/22/22 0801)   sodium chloride 0.9% bolus 500 mL (0 mLs Intravenous Stopped 2/22/22 0900)   aspirin tablet 325 mg (325 mg Oral Given 2/22/22 0759)   nitroGLYCERIN SL tablet 0.4 mg (0.4 mg Sublingual Given 2/22/22 0759)   heparin 25,000 units in dextrose 5% (100 units/ml) IV bolus from bag INITIAL BOLUS (max bolus 4000 units) (2,720 Units Intravenous Bolus from Bag 2/22/22 0847)   atorvastatin tablet 80 mg (80 mg Oral Given 2/22/22 0858)   clopidogreL tablet 600 mg (600 mg Oral Given 2/22/22 0858)     Medical Decision Making:   Clinical Tests:   Lab Tests: Ordered  Radiological Study: Ordered  Medical Tests: Ordered  ED Management:  61 y.o. year old female presenting with mid sternal chest pain initially intermittent now constant since last night.  On exam patient is afebrile and nontoxic.  She is tearful. HEENT exam normal. Heart rate and rhythm are regular. Lungs with clear breath sounds throughout. Abdomen is soft, nontender. No edema.    DDx includes but is not limited to ACS, NSTEMI, pancreatitis, GERD.    ED workup reveals EKG without ischemia.  Troponin 2.459.  Lipase negative.  CBC with leukocytosis suspect from vomiting.  CMP fairly unremarkable.    Plan asa 325, nitro.  Consulted cardiology, heparin drip initiated and loaded with plavix.  Interventional cardiology consulted and pt taken to cath lab.  Care taken over by cardiology.                      Clinical Impression:   Final diagnoses:  [R07.9] Chest pain                 Casie Langford PA-C  02/22/22 1028

## 2022-02-22 NOTE — SUBJECTIVE & OBJECTIVE
Past Medical History:   Diagnosis Date    Tobacco abuse 2022       Past Surgical History:   Procedure Laterality Date     SECTION      HYSTERECTOMY      OOPHORECTOMY      [place] REMOVAL OF OVARY/TUBE(S)         Review of patient's allergies indicates:  No Known Allergies    PTA Medications   Medication Sig    alendronate (FOSAMAX) 70 MG tablet Take 1 tablet (70 mg total) by mouth every 7 days.    aspirin 325 MG tablet Take 325 mg by mouth once daily.    HYDROcodone-acetaminophen (NORCO) 5-325 mg per tablet TK 1 T PO  Q 8 H PRN    meloxicam (MOBIC) 7.5 MG tablet TAKE 1 TABLET BY MOUTH DAILY     Family History       Problem Relation (Age of Onset)    Breast cancer Paternal Aunt    Heart attack Father          Tobacco Use    Smoking status: Former Smoker    Smokeless tobacco: Never Used   Substance and Sexual Activity    Alcohol use: Not Currently    Drug use: Yes     Types: Marijuana    Sexual activity: Not on file     Review of Systems   Constitutional: Negative.   HENT: Negative.     Eyes: Negative.    Cardiovascular:  Positive for chest pain.   Respiratory:  Positive for shortness of breath.    Endocrine: Negative.    Gastrointestinal:  Positive for bloating, nausea and vomiting.   Genitourinary: Negative.    Neurological: Negative.    Objective:     Vital Signs (Most Recent):  Temp: 97.6 °F (36.4 °C) (22 1600)  Pulse: 92 (22 1600)  Resp: 18 (22 1600)  BP: 105/78 (22 1600)  SpO2: 95 % (22 1600) Vital Signs (24h Range):  Temp:  [97.6 °F (36.4 °C)-98.7 °F (37.1 °C)] 97.6 °F (36.4 °C)  Pulse:  [81-92] 92  Resp:  [11-44] 18  SpO2:  [94 %-99 %] 95 %  BP: (105-152)/() 105/78     Weight: 45.4 kg (100 lb)  Body mass index is 15.66 kg/m².    SpO2: 95 %  O2 Device (Oxygen Therapy): room air    No intake or output data in the 24 hours ending 22 1631    Lines/Drains/Airways       Peripheral Intravenous Line  Duration                  Peripheral IV - Single Lumen  02/22/22 0701 20 G Left Antecubital <1 day                    Physical Exam  Constitutional:       Appearance: Normal appearance.   HENT:      Head: Normocephalic.      Nose: Nose normal.   Eyes:      Extraocular Movements: Extraocular movements intact.      Pupils: Pupils are equal, round, and reactive to light.   Cardiovascular:      Rate and Rhythm: Normal rate and regular rhythm.   Pulmonary:      Effort: Pulmonary effort is normal.      Breath sounds: Normal breath sounds.   Abdominal:      General: Abdomen is flat. Bowel sounds are normal.      Palpations: Abdomen is soft.   Musculoskeletal:      Cervical back: Normal range of motion.   Skin:     General: Skin is warm.      Capillary Refill: Capillary refill takes less than 2 seconds.   Neurological:      General: No focal deficit present.      Mental Status: She is alert and oriented to person, place, and time.       Significant Labs: All pertinent lab results from the last 24 hours have been reviewed.

## 2022-02-22 NOTE — ED TRIAGE NOTES
Mirtha Jewell, a 61 y.o. female presents to the ED w/ complaint of epigastric abdominal pain, N/V for the past 3 days. States unable to keep oral intake down. Pain radiates to mid sternal chest. Pt concerned for cardiac involvement, appears anxious and tearful throughout assessment. Denies sob.     Triage note:  Chief Complaint   Patient presents with    Abdominal Pain     Epigastric pain that radiates to center chest, +N/V x 3 days. Denies sob states decrease in oral intake over the past week. Pt tearful in triage     Review of patient's allergies indicates:  No Known Allergies  No past medical history on file.

## 2022-02-22 NOTE — Clinical Note
100 ml of contrast were injected throughout the case. 100 mL of contrast was the total wasted during the case. 200 mL was the total amount used during the case.

## 2022-02-22 NOTE — HPI
Ms Mirtha Jewell is a 61 year old female with PMH of osteoporosis who [place] [facility] [place] and substernal chest pain x3 days. Describes upper abdomen/substernal pressure like discomfort intermittently for the past 3 days. States the pain comes and goes and typically is worse when she is at work and on her feet. Initially improved with rest but earlier this morning developed the same discomfort at rest. She has had associated nausea and threw up a few times, nonbloody, nonbilious. She reports some diaphoresis last night   On presentation EKG sinus rhythm with small septal Q waves, generalized T wave flattening, no ST depressions or elevations. Her pain was initially 8/10 but improved somewhat with sublingual nitroglycerin. She received 3 doses and reports improvement to 2/10 but not complete resolution.  She also has a troponin of 2.5.  She was taken immediately to cath lab from the emergency room.  She was found to have proximal LAD and mid circumflex lesions for which she underwent PCI with drug-eluting stents placement.  Currently she is chest pain-free denies having any nausea vomiting..  Post procedure she received 150 cc fluids for 4 hours

## 2022-02-22 NOTE — NURSING TRANSFER
Nursing Transfer Note      2/22/2022     Reason patient is being transferred: stepdown    Transfer From: cath lab to room 328    Transfer via bed    Transfer with cardiac monitoring    Transported by RN    Medicines sent: yes    Any special needs or follow-up needed: monitor Rt wrist    Chart send with patient: Yes    Notified: spouse    Patient reassessed at: 1600 02/22/2022 Rt wrist dressing CDI    Upon arrival to floor: cardiac monitor applied, patient oriented to room, call bell in reach and bed in lowest position

## 2022-02-23 PROBLEM — I50.20 HFREF (HEART FAILURE WITH REDUCED EJECTION FRACTION): Status: ACTIVE | Noted: 2022-02-23

## 2022-02-23 LAB
ALBUMIN SERPL BCP-MCNC: 3.1 G/DL (ref 3.5–5.2)
ALP SERPL-CCNC: 54 U/L (ref 55–135)
ALT SERPL W/O P-5'-P-CCNC: 11 U/L (ref 10–44)
ANION GAP SERPL CALC-SCNC: 13 MMOL/L (ref 8–16)
ASCENDING AORTA: 3.54 CM
AST SERPL-CCNC: 30 U/L (ref 10–40)
AV INDEX (PROSTH): 1.04
AV MEAN GRADIENT: 2 MMHG
AV PEAK GRADIENT: 2 MMHG
AV VALVE AREA: 3.13 CM2
AV VELOCITY RATIO: 1.05
BASOPHILS # BLD AUTO: 0.05 K/UL (ref 0–0.2)
BASOPHILS NFR BLD: 0.5 % (ref 0–1.9)
BILIRUB SERPL-MCNC: 1 MG/DL (ref 0.1–1)
BSA FOR ECHO PROCEDURE: 1.46 M2
BUN SERPL-MCNC: 19 MG/DL (ref 8–23)
CALCIUM SERPL-MCNC: 9.2 MG/DL (ref 8.7–10.5)
CHLORIDE SERPL-SCNC: 105 MMOL/L (ref 95–110)
CHOLEST SERPL-MCNC: 155 MG/DL (ref 120–199)
CHOLEST/HDLC SERPL: 2.6 {RATIO} (ref 2–5)
CO2 SERPL-SCNC: 20 MMOL/L (ref 23–29)
CREAT SERPL-MCNC: 0.6 MG/DL (ref 0.5–1.4)
CV ECHO LV RWT: 0.28 CM
DIFFERENTIAL METHOD: ABNORMAL
DOP CALC AO PEAK VEL: 0.79 M/S
DOP CALC AO VTI: 12.05 CM
DOP CALC LVOT AREA: 3 CM2
DOP CALC LVOT DIAMETER: 1.96 CM
DOP CALC LVOT PEAK VEL: 0.83 M/S
DOP CALC LVOT STROKE VOLUME: 37.7 CM3
DOP CALCLVOT PEAK VEL VTI: 12.5 CM
ECHO LV POSTERIOR WALL: 0.77 CM (ref 0.6–1.1)
EJECTION FRACTION: 20 %
EOSINOPHIL # BLD AUTO: 0 K/UL (ref 0–0.5)
EOSINOPHIL NFR BLD: 0.1 % (ref 0–8)
ERYTHROCYTE [DISTWIDTH] IN BLOOD BY AUTOMATED COUNT: 13.1 % (ref 11.5–14.5)
EST. GFR  (AFRICAN AMERICAN): >60 ML/MIN/1.73 M^2
EST. GFR  (NON AFRICAN AMERICAN): >60 ML/MIN/1.73 M^2
FRACTIONAL SHORTENING: 16 % (ref 28–44)
GLUCOSE SERPL-MCNC: 116 MG/DL (ref 70–110)
HCT VFR BLD AUTO: 40.7 % (ref 37–48.5)
HDLC SERPL-MCNC: 60 MG/DL (ref 40–75)
HDLC SERPL: 38.7 % (ref 20–50)
HGB BLD-MCNC: 13 G/DL (ref 12–16)
IMM GRANULOCYTES # BLD AUTO: 0.04 K/UL (ref 0–0.04)
IMM GRANULOCYTES NFR BLD AUTO: 0.4 % (ref 0–0.5)
INTERVENTRICULAR SEPTUM: 0.9 CM (ref 0.6–1.1)
LA MAJOR: 5 CM
LA MINOR: 5.65 CM
LA WIDTH: 3.66 CM
LDLC SERPL CALC-MCNC: 78 MG/DL (ref 63–159)
LEFT ATRIUM SIZE: 3.5 CM
LEFT ATRIUM VOLUME INDEX: 38.3 ML/M2
LEFT ATRIUM VOLUME: 57.77 CM3
LEFT INTERNAL DIMENSION IN SYSTOLE: 4.7 CM (ref 2.1–4)
LEFT VENTRICLE DIASTOLIC VOLUME INDEX: 84.8 ML/M2
LEFT VENTRICLE DIASTOLIC VOLUME: 128.05 ML
LEFT VENTRICLE MASS INDEX: 115 G/M2
LEFT VENTRICLE SYSTOLIC VOLUME INDEX: 65.1 ML/M2
LEFT VENTRICLE SYSTOLIC VOLUME: 98.25 ML
LEFT VENTRICULAR INTERNAL DIMENSION IN DIASTOLE: 5.6 CM (ref 3.5–6)
LEFT VENTRICULAR MASS: 174.18 G
LYMPHOCYTES # BLD AUTO: 2.4 K/UL (ref 1–4.8)
LYMPHOCYTES NFR BLD: 21.8 % (ref 18–48)
MCH RBC QN AUTO: 29.3 PG (ref 27–31)
MCHC RBC AUTO-ENTMCNC: 31.9 G/DL (ref 32–36)
MCV RBC AUTO: 92 FL (ref 82–98)
MONOCYTES # BLD AUTO: 0.7 K/UL (ref 0.3–1)
MONOCYTES NFR BLD: 6.8 % (ref 4–15)
NEUTROPHILS # BLD AUTO: 7.7 K/UL (ref 1.8–7.7)
NEUTROPHILS NFR BLD: 70.4 % (ref 38–73)
NONHDLC SERPL-MCNC: 95 MG/DL
NRBC BLD-RTO: 0 /100 WBC
PLATELET # BLD AUTO: 377 K/UL (ref 150–450)
PMV BLD AUTO: 10.2 FL (ref 9.2–12.9)
POTASSIUM SERPL-SCNC: 4.2 MMOL/L (ref 3.5–5.1)
PROT SERPL-MCNC: 6.7 G/DL (ref 6–8.4)
RA MAJOR: 4.19 CM
RA PRESSURE: 8 MMHG
RA WIDTH: 3.51 CM
RBC # BLD AUTO: 4.44 M/UL (ref 4–5.4)
RIGHT VENTRICULAR END-DIASTOLIC DIMENSION: 2.96 CM
SINUS: 3.32 CM
SODIUM SERPL-SCNC: 138 MMOL/L (ref 136–145)
STJ: 3.48 CM
TDI LATERAL: 0.05 M/S
TDI SEPTAL: 0.05 M/S
TDI: 0.05 M/S
TRICUSPID ANNULAR PLANE SYSTOLIC EXCURSION: 1.59 CM
TRIGL SERPL-MCNC: 85 MG/DL (ref 30–150)
WBC # BLD AUTO: 10.87 K/UL (ref 3.9–12.7)

## 2022-02-23 PROCEDURE — 85025 COMPLETE CBC W/AUTO DIFF WBC: CPT | Performed by: INTERNAL MEDICINE

## 2022-02-23 PROCEDURE — 20600001 HC STEP DOWN PRIVATE ROOM

## 2022-02-23 PROCEDURE — 63600175 PHARM REV CODE 636 W HCPCS: Performed by: INTERNAL MEDICINE

## 2022-02-23 PROCEDURE — 80061 LIPID PANEL: CPT | Performed by: INTERNAL MEDICINE

## 2022-02-23 PROCEDURE — 36415 COLL VENOUS BLD VENIPUNCTURE: CPT | Performed by: INTERNAL MEDICINE

## 2022-02-23 PROCEDURE — 25500020 PHARM REV CODE 255: Performed by: STUDENT IN AN ORGANIZED HEALTH CARE EDUCATION/TRAINING PROGRAM

## 2022-02-23 PROCEDURE — 94761 N-INVAS EAR/PLS OXIMETRY MLT: CPT

## 2022-02-23 PROCEDURE — 25000003 PHARM REV CODE 250: Performed by: STUDENT IN AN ORGANIZED HEALTH CARE EDUCATION/TRAINING PROGRAM

## 2022-02-23 PROCEDURE — 80053 COMPREHEN METABOLIC PANEL: CPT | Performed by: INTERNAL MEDICINE

## 2022-02-23 PROCEDURE — 99231 PR SUBSEQUENT HOSPITAL CARE,LEVL I: ICD-10-PCS | Mod: ,,, | Performed by: INTERNAL MEDICINE

## 2022-02-23 PROCEDURE — 99231 SBSQ HOSP IP/OBS SF/LOW 25: CPT | Mod: ,,, | Performed by: INTERNAL MEDICINE

## 2022-02-23 PROCEDURE — 25000003 PHARM REV CODE 250: Performed by: INTERNAL MEDICINE

## 2022-02-23 PROCEDURE — 25500020 PHARM REV CODE 255: Performed by: INTERNAL MEDICINE

## 2022-02-23 RX ORDER — ATORVASTATIN CALCIUM 80 MG/1
80 TABLET, FILM COATED ORAL DAILY
Qty: 90 TABLET | Refills: 3 | Status: SHIPPED | OUTPATIENT
Start: 2022-02-23 | End: 2022-03-03 | Stop reason: SDUPTHER

## 2022-02-23 RX ORDER — CLOPIDOGREL BISULFATE 75 MG/1
75 TABLET ORAL DAILY
Qty: 30 TABLET | Refills: 11 | Status: SHIPPED | OUTPATIENT
Start: 2022-02-23 | End: 2022-03-03 | Stop reason: SDUPTHER

## 2022-02-23 RX ORDER — FUROSEMIDE 10 MG/ML
20 INJECTION INTRAMUSCULAR; INTRAVENOUS ONCE
Status: COMPLETED | OUTPATIENT
Start: 2022-02-23 | End: 2022-02-23

## 2022-02-23 RX ORDER — HYDROCODONE BITARTRATE AND ACETAMINOPHEN 10; 325 MG/1; MG/1
1 TABLET ORAL EVERY 6 HOURS PRN
Status: DISCONTINUED | OUTPATIENT
Start: 2022-02-23 | End: 2022-02-24 | Stop reason: HOSPADM

## 2022-02-23 RX ORDER — NAPROXEN SODIUM 220 MG/1
81 TABLET, FILM COATED ORAL DAILY
Qty: 30 TABLET | Refills: 11 | Status: SHIPPED | OUTPATIENT
Start: 2022-02-23 | End: 2023-02-23

## 2022-02-23 RX ORDER — LOSARTAN POTASSIUM 25 MG/1
25 TABLET ORAL DAILY
Qty: 90 TABLET | Refills: 3 | Status: SHIPPED | OUTPATIENT
Start: 2022-02-24 | End: 2022-02-24 | Stop reason: SDUPTHER

## 2022-02-23 RX ORDER — LOSARTAN POTASSIUM 50 MG/1
50 TABLET ORAL DAILY
Status: DISCONTINUED | OUTPATIENT
Start: 2022-02-24 | End: 2022-02-24

## 2022-02-23 RX ORDER — SPIRONOLACTONE 25 MG/1
25 TABLET ORAL DAILY
Status: DISCONTINUED | OUTPATIENT
Start: 2022-02-24 | End: 2022-02-24 | Stop reason: HOSPADM

## 2022-02-23 RX ORDER — METOPROLOL SUCCINATE 50 MG/1
50 TABLET, EXTENDED RELEASE ORAL DAILY
Qty: 30 TABLET | Refills: 11 | Status: SHIPPED | OUTPATIENT
Start: 2022-02-24 | End: 2022-02-24 | Stop reason: SDUPTHER

## 2022-02-23 RX ADMIN — IOHEXOL 15 ML: 350 INJECTION, SOLUTION INTRAVENOUS at 11:02

## 2022-02-23 RX ADMIN — ATORVASTATIN CALCIUM 80 MG: 20 TABLET, FILM COATED ORAL at 09:02

## 2022-02-23 RX ADMIN — HYDROCODONE BITARTRATE AND ACETAMINOPHEN 1 TABLET: 10; 325 TABLET ORAL at 06:02

## 2022-02-23 RX ADMIN — ACETAMINOPHEN 650 MG: 325 TABLET ORAL at 08:02

## 2022-02-23 RX ADMIN — ASPIRIN 81 MG CHEWABLE TABLET 81 MG: 81 TABLET CHEWABLE at 09:02

## 2022-02-23 RX ADMIN — ONDANSETRON 4 MG: 2 INJECTION INTRAMUSCULAR; INTRAVENOUS at 01:02

## 2022-02-23 RX ADMIN — METOPROLOL TARTRATE 25 MG: 25 TABLET, FILM COATED ORAL at 09:02

## 2022-02-23 RX ADMIN — IOHEXOL 15 ML: 350 INJECTION, SOLUTION INTRAVENOUS at 10:02

## 2022-02-23 RX ADMIN — FUROSEMIDE 20 MG: 10 INJECTION, SOLUTION INTRAMUSCULAR; INTRAVENOUS at 01:02

## 2022-02-23 RX ADMIN — LOSARTAN POTASSIUM 25 MG: 25 TABLET, FILM COATED ORAL at 09:02

## 2022-02-23 RX ADMIN — CLOPIDOGREL 75 MG: 75 TABLET, FILM COATED ORAL at 09:02

## 2022-02-23 RX ADMIN — HYDROCODONE BITARTRATE AND ACETAMINOPHEN 1 TABLET: 10; 325 TABLET ORAL at 08:02

## 2022-02-23 RX ADMIN — HUMAN ALBUMIN MICROSPHERES AND PERFLUTREN 0.66 MG: 10; .22 INJECTION, SOLUTION INTRAVENOUS at 09:02

## 2022-02-23 NOTE — PLAN OF CARE
Echo and CT done today. Pt c/o N/V , zofran given. Pt stated feel much better after med given. Rt wrist site dressing CDI, no s/s of bleeding. Spouse at bedside. Urine output 2600 mL during shift. Pt educated on fall risk and remained free from falls/trauma/injury. Denies chest pain, SOB, palpitations, dizziness, pain, or discomfort. Plan of care reviewed with pt, all questions answered. Bed locked in lowest position, call bell within reach, no acute distress noted, will continue to monitor.

## 2022-02-23 NOTE — SUBJECTIVE & OBJECTIVE
Interval History: Doing well this am. Denies chest pain but does report ongoing nausea. CT A/P which  unremarkable. TTE was found to have EF 20% so she was started on GDMT.    Review of Systems   Constitutional: Positive for decreased appetite and weight loss. Negative for chills, fever, malaise/fatigue, night sweats and weight gain.   HENT:  Negative for odynophagia and sore throat.    Eyes:  Negative for double vision.   Cardiovascular:  Negative for chest pain, dyspnea on exertion and leg swelling.   Endocrine: Negative for cold intolerance and heat intolerance.   Musculoskeletal:  Negative for joint swelling.   Gastrointestinal:  Positive for abdominal pain. Negative for constipation and diarrhea.   Genitourinary:  Negative for dysuria.   Neurological:  Negative for dizziness and focal weakness.   Psychiatric/Behavioral:  Negative for depression. The patient is not nervous/anxious.    Objective:     Vital Signs (Most Recent):  Temp: 99.4 °F (37.4 °C) (02/23/22 1513)  Pulse: 72 (02/23/22 1532)  Resp: 17 (02/23/22 1513)  BP: 106/69 (02/23/22 1513)  SpO2: 95 % (02/23/22 1532) Vital Signs (24h Range):  Temp:  [96.2 °F (35.7 °C)-99.4 °F (37.4 °C)] 99.4 °F (37.4 °C)  Pulse:  [59-98] 72  Resp:  [17-20] 17  SpO2:  [92 %-96 %] 95 %  BP: (105-123)/(62-88) 106/69     Weight: 45.4 kg (100 lb)  Body mass index is 15.66 kg/m².     SpO2: 95 %  O2 Device (Oxygen Therapy): room air      Intake/Output Summary (Last 24 hours) at 2/23/2022 1548  Last data filed at 2/23/2022 1200  Gross per 24 hour   Intake 120 ml   Output 300 ml   Net -180 ml       Lines/Drains/Airways       Peripheral Intravenous Line  Duration                  Peripheral IV - Single Lumen 02/22/22 0701 20 G Left Antecubital 1 day                    Physical Exam  Constitutional:       General: She is not in acute distress.     Appearance: She is well-developed. She is not diaphoretic.   HENT:      Head: Normocephalic and atraumatic.   Eyes:      Pupils: Pupils  are equal, round, and reactive to light.   Neck:      Thyroid: No thyromegaly.      Vascular: No JVD.   Cardiovascular:      Heart sounds: No murmur heard.  Pulmonary:      Effort: No respiratory distress.      Breath sounds: Normal breath sounds. No stridor. No wheezing or rales.   Abdominal:      General: There is no distension.   Musculoskeletal:         General: No deformity.   Skin:     Findings: No erythema.   Neurological:      Mental Status: She is alert and oriented to person, place, and time.      Cranial Nerves: No cranial nerve deficit.      Deep Tendon Reflexes: Reflexes are normal and symmetric.       Significant Labs: BMP:   Recent Labs   Lab 02/22/22  0659 02/23/22  0445   * 116*    138   K 4.3 4.2    105   CO2 23 20*   BUN 16 19   CREATININE 0.6 0.6   CALCIUM 9.7 9.2   , CMP   Recent Labs   Lab 02/22/22  0659 02/23/22  0445    138   K 4.3 4.2    105   CO2 23 20*   * 116*   BUN 16 19   CREATININE 0.6 0.6   CALCIUM 9.7 9.2   PROT 7.3 6.7   ALBUMIN 3.5 3.1*   BILITOT 0.9 1.0   ALKPHOS 61 54*   AST 22 30   ALT 11 11   ANIONGAP 11 13   ESTGFRAFRICA >60.0 >60.0   EGFRNONAA >60.0 >60.0   , and CBC   Recent Labs   Lab 02/22/22  0659 02/23/22  0445   WBC 14.58* 10.87   HGB 13.1 13.0   HCT 39.3 40.7    377       Significant Imaging:  n/a

## 2022-02-23 NOTE — HOSPITAL COURSE
Patient admitted with chest pain and abdominal pain, found to have NSTEMI, underwent C found to have 2 vessel disease s/p 2 sent placements (see details below). For her abdominal pain she underwent CT A/P which was unremarkable. She does report ~20 lbs weight loss in the last 2 weeks associated with poor appetite. She will follow with PCP for this. TTE was found to have EF 20% so she was started on GDMT. She wal;ked on the hallways without Sob or any other issue. BP have been soft so will start with small dose of losartan and spironolactone. She is stable for discharge today and will f/u with PCP and Cardiology.    [facility] 2/22/22  two vessel coronary artery disease.  The pre-procedure left ventricular end diastolic pressure was 30.  The Prox LAD to Mid LAD lesion was 90% stenosed with 0% stenosis post-intervention.  A STENT RESOLUTE STEPHANIE 4.0X22MM stent was successfully placed.  The Mid Cx to Dist Cx lesion was 90% stenosed with 0% stenosis post-intervention.  A STENT RESOLUTE STEPHANIE 3.0X08MM at 12 SARAH for 10 sec.  The PCI was successful.  The estimated blood loss was <50 mL.

## 2022-02-23 NOTE — ASSESSMENT & PLAN NOTE
60 yo F with PMhx of smoking, marihuana use, presenting with abdominal and chest pain for several weeks. Found to have EKG with septal Q waves, generalized T wave flattening, no ST depressions or elevations. S/p [facility] 2/22/22  · two vessel coronary artery disease.  · The pre-procedure left ventricular end diastolic pressure was 30.  · The Prox LAD to Mid LAD lesion was 90% stenosed with 0% stenosis post-intervention.  · A STENT RESOLUTE STEPHANIE 4.0X22MM stent was successfully placed.  · The Mid Cx to Dist Cx lesion was 90% stenosed with 0% stenosis post-intervention.  · A STENT RESOLUTE STEPHANIE 3.0X08MM at 12 SARAH for 10 sec.  · The PCI was successful.  · The estimated blood loss was <50 mL.    Plan:  - Continue ASA  81mg qd  - Plavix   75mg  - Lipitor 40 mg qhs  - Metoprolol 25mg po BID  - Losartan 50 mg qhd

## 2022-02-23 NOTE — PROGRESS NOTES
[facility] - Cardiology Stepdown  Cardiology  Progress Note    Patient Name: Mirtha Jewell  MRN: [id number]  Admission Date: 2/22/2022  Hospital Length of Stay: 1 days  Code Status: Full Code   Attending Physician: No att. providers found   Primary Care Physician: Primary Doctor No  Expected Discharge Date: 2/23/2022  Principal Problem:NSTEMI (non-ST elevated myocardial infarction)    Subjective:     Hospital Course:   Patient admitted with chest pain and abdominal pain, found to have NSTEMI, underwent LHC found to have 2 vessel disease s/p 2 sent placements (see details below). For her abdominal pain she underwent CT A/P which was unremarkable. She does report ~20 lbs weight loss in the last 2 weeks associated with poor appetite. She will follow with PCP for this. TTE was found to have EF 20% so she was started on GDMT    [facility] 2/22/22  · two vessel coronary artery disease.  · The pre-procedure left ventricular end diastolic pressure was 30.  · The Prox LAD to Mid LAD lesion was 90% stenosed with 0% stenosis post-intervention.  · A STENT RESOLUTE STEPHANIE 4.0X22MM stent was successfully placed.  · The Mid Cx to Dist Cx lesion was 90% stenosed with 0% stenosis post-intervention.  · A STENT RESOLUTE STEPHANIE 3.0X08MM at 12 SARAH for 10 sec.  · The PCI was successful.  · The estimated blood loss was <50 mL.         Interval History: Doing well this am. Denies chest pain but does report ongoing nausea. CT A/P which  unremarkable. TTE was found to have EF 20% so she was started on GDMT.    Review of Systems   Constitutional: Positive for decreased appetite and weight loss. Negative for chills, fever, malaise/fatigue, night sweats and weight gain.   HENT:  Negative for odynophagia and sore throat.    Eyes:  Negative for double vision.   Cardiovascular:  Negative for chest pain, dyspnea on exertion and leg swelling.   Endocrine: Negative for cold intolerance and heat intolerance.   Musculoskeletal:  Negative for joint swelling.    Gastrointestinal:  Positive for abdominal pain. Negative for constipation and diarrhea.   Genitourinary:  Negative for dysuria.   Neurological:  Negative for dizziness and focal weakness.   Psychiatric/Behavioral:  Negative for depression. The patient is not nervous/anxious.    Objective:     Vital Signs (Most Recent):  Temp: 99.4 °F (37.4 °C) (02/23/22 1513)  Pulse: 72 (02/23/22 1532)  Resp: 17 (02/23/22 1513)  BP: 106/69 (02/23/22 1513)  SpO2: 95 % (02/23/22 1532) Vital Signs (24h Range):  Temp:  [96.2 °F (35.7 °C)-99.4 °F (37.4 °C)] 99.4 °F (37.4 °C)  Pulse:  [59-98] 72  Resp:  [17-20] 17  SpO2:  [92 %-96 %] 95 %  BP: (105-123)/(62-88) 106/69     Weight: 45.4 kg (100 lb)  Body mass index is 15.66 kg/m².     SpO2: 95 %  O2 Device (Oxygen Therapy): room air      Intake/Output Summary (Last 24 hours) at 2/23/2022 1548  Last data filed at 2/23/2022 1200  Gross per 24 hour   Intake 120 ml   Output 300 ml   Net -180 ml       Lines/Drains/Airways       Peripheral Intravenous Line  Duration                  Peripheral IV - Single Lumen 02/22/22 0701 20 G Left Antecubital 1 day                    Physical Exam  Constitutional:       General: She is not in acute distress.     Appearance: She is well-developed. She is not diaphoretic.   HENT:      Head: Normocephalic and atraumatic.   Eyes:      Pupils: Pupils are equal, round, and reactive to light.   Neck:      Thyroid: No thyromegaly.      Vascular: No JVD.   Cardiovascular:      Heart sounds: No murmur heard.  Pulmonary:      Effort: No respiratory distress.      Breath sounds: Normal breath sounds. No stridor. No wheezing or rales.   Abdominal:      General: There is no distension.   Musculoskeletal:         General: No deformity.   Skin:     Findings: No erythema.   Neurological:      Mental Status: She is alert and oriented to person, place, and time.      Cranial Nerves: No cranial nerve deficit.      Deep Tendon Reflexes: Reflexes are normal and symmetric.        Significant Labs: BMP:   Recent Labs   Lab 02/22/22  0659 02/23/22  0445   * 116*    138   K 4.3 4.2    105   CO2 23 20*   BUN 16 19   CREATININE 0.6 0.6   CALCIUM 9.7 9.2   , CMP   Recent Labs   Lab 02/22/22  0659 02/23/22  0445    138   K 4.3 4.2    105   CO2 23 20*   * 116*   BUN 16 19   CREATININE 0.6 0.6   CALCIUM 9.7 9.2   PROT 7.3 6.7   ALBUMIN 3.5 3.1*   BILITOT 0.9 1.0   ALKPHOS 61 54*   AST 22 30   ALT 11 11   ANIONGAP 11 13   ESTGFRAFRICA >60.0 >60.0   EGFRNONAA >60.0 >60.0   , and CBC   Recent Labs   Lab 02/22/22  0659 02/23/22  0445   WBC 14.58* 10.87   HGB 13.1 13.0   HCT 39.3 40.7    377       Significant Imaging:  n/a    Assessment and Plan:       * NSTEMI (non-ST elevated myocardial infarction)  60 yo F with PMhx of smoking, marihuana use, presenting with abdominal and chest pain for several weeks. Found to have EKG with septal Q waves, generalized T wave flattening, no ST depressions or elevations. S/p [facility] 2/22/22  · two vessel coronary artery disease.  · The pre-procedure left ventricular end diastolic pressure was 30.  · The Prox LAD to Mid LAD lesion was 90% stenosed with 0% stenosis post-intervention.  · A STENT RESOLUTE STEPHANIE 4.0X22MM stent was successfully placed.  · The Mid Cx to Dist Cx lesion was 90% stenosed with 0% stenosis post-intervention.  · A STENT RESOLUTE STEPHANIE 3.0X08MM at 12 SARAH for 10 sec.  · The PCI was successful.  · The estimated blood loss was <50 mL.    Plan:  - Continue ASA  81mg qd  - Plavix   75mg  - Lipitor 40 mg qhs  - Metoprolol 25mg po BID  - Losartan 50 mg qhd        HFrEF (heart failure with reduced ejection fraction)  Newly diagnosed HFrEF  Likely 2/2 ischemia but substance abuse could also be contributing.     Plan:   Optimize GDMT  - Metoprolol 25mg po BID- will switch to succinate in AM  - Losartan 50 mg qd  - Spironolactone 25 mg qd  - Cardiology rehab        Marijuana dependence  -daily marijuana  use  encourage cessation    Cachexia  -chronic  - consider GI evaluation, nutrition consult    Tobacco abuse  Prior tobacco use, 20 years        VTE Risk Mitigation (From admission, onward)         Ordered     heparin (porcine) injection  As needed (PRN)         02/22/22 0933     heparin (porcine) 750 Units, verapamiL 1.25 mg, nitroGLYCERIN 1.25 mg in sodium chloride 0.9% 250 mL  As needed (PRN)         02/22/22 0934     heparin infusion 1,000 units/500 ml in 0.9% NaCl (on sterile field)  As needed (PRN)         02/22/22 0934                Ranjith Rocha MD  Cardiology  [facility] - Cardiology Stepdown

## 2022-02-23 NOTE — PLAN OF CARE
Recv'd pt in bed upside down in bed per her choice. AAO, no sob on room air, no distressnoted. Pt reports back pain 8/10, and reports feeling nauseous prior to writers shift. Pt reports nausea is resolved at this time. No sign of bleeding or hematoma noted to left wrist. Meds given and davi well.  at bedside. All needs met. VSS and SR on monitor. Will cont to monitor and follow POC.

## 2022-02-23 NOTE — ASSESSMENT & PLAN NOTE
Newly diagnosed HFrEF  Likely 2/2 ischemia but substance abuse could also be contributing.     Plan:   Optimize GDMT  - Metoprolol 25mg po BID- will switch to succinate in AM  - Losartan 50 mg qd  - Spironolactone 25 mg qd  - Cardiology rehab

## 2022-02-24 VITALS
HEART RATE: 61 BPM | OXYGEN SATURATION: 96 % | TEMPERATURE: 98 F | WEIGHT: 100 LBS | BODY MASS INDEX: 15.7 KG/M2 | SYSTOLIC BLOOD PRESSURE: 98 MMHG | RESPIRATION RATE: 17 BRPM | DIASTOLIC BLOOD PRESSURE: 62 MMHG | HEIGHT: 67 IN

## 2022-02-24 LAB
ALBUMIN SERPL BCP-MCNC: 2.8 G/DL (ref 3.5–5.2)
ALP SERPL-CCNC: 51 U/L (ref 55–135)
ALT SERPL W/O P-5'-P-CCNC: 11 U/L (ref 10–44)
ANION GAP SERPL CALC-SCNC: 10 MMOL/L (ref 8–16)
AST SERPL-CCNC: 23 U/L (ref 10–40)
BASOPHILS # BLD AUTO: 0.06 K/UL (ref 0–0.2)
BASOPHILS NFR BLD: 0.6 % (ref 0–1.9)
BILIRUB SERPL-MCNC: 0.9 MG/DL (ref 0.1–1)
BUN SERPL-MCNC: 16 MG/DL (ref 8–23)
CALCIUM SERPL-MCNC: 8.9 MG/DL (ref 8.7–10.5)
CHLORIDE SERPL-SCNC: 102 MMOL/L (ref 95–110)
CO2 SERPL-SCNC: 26 MMOL/L (ref 23–29)
CREAT SERPL-MCNC: 0.6 MG/DL (ref 0.5–1.4)
DIFFERENTIAL METHOD: ABNORMAL
EOSINOPHIL # BLD AUTO: 0.1 K/UL (ref 0–0.5)
EOSINOPHIL NFR BLD: 1.3 % (ref 0–8)
ERYTHROCYTE [DISTWIDTH] IN BLOOD BY AUTOMATED COUNT: 12.9 % (ref 11.5–14.5)
EST. GFR  (AFRICAN AMERICAN): >60 ML/MIN/1.73 M^2
EST. GFR  (NON AFRICAN AMERICAN): >60 ML/MIN/1.73 M^2
GLUCOSE SERPL-MCNC: 90 MG/DL (ref 70–110)
HCT VFR BLD AUTO: 35.3 % (ref 37–48.5)
HGB BLD-MCNC: 11.9 G/DL (ref 12–16)
IMM GRANULOCYTES # BLD AUTO: 0.03 K/UL (ref 0–0.04)
IMM GRANULOCYTES NFR BLD AUTO: 0.3 % (ref 0–0.5)
LYMPHOCYTES # BLD AUTO: 2.6 K/UL (ref 1–4.8)
LYMPHOCYTES NFR BLD: 26 % (ref 18–48)
MCH RBC QN AUTO: 29.8 PG (ref 27–31)
MCHC RBC AUTO-ENTMCNC: 33.7 G/DL (ref 32–36)
MCV RBC AUTO: 88 FL (ref 82–98)
MONOCYTES # BLD AUTO: 0.7 K/UL (ref 0.3–1)
MONOCYTES NFR BLD: 7.5 % (ref 4–15)
NEUTROPHILS # BLD AUTO: 6.3 K/UL (ref 1.8–7.7)
NEUTROPHILS NFR BLD: 64.3 % (ref 38–73)
NRBC BLD-RTO: 0 /100 WBC
PLATELET # BLD AUTO: 281 K/UL (ref 150–450)
PMV BLD AUTO: 10.4 FL (ref 9.2–12.9)
POTASSIUM SERPL-SCNC: 3.9 MMOL/L (ref 3.5–5.1)
PROT SERPL-MCNC: 6.2 G/DL (ref 6–8.4)
RBC # BLD AUTO: 4 M/UL (ref 4–5.4)
SODIUM SERPL-SCNC: 138 MMOL/L (ref 136–145)
WBC # BLD AUTO: 9.83 K/UL (ref 3.9–12.7)

## 2022-02-24 PROCEDURE — 97161 PT EVAL LOW COMPLEX 20 MIN: CPT

## 2022-02-24 PROCEDURE — 80053 COMPREHEN METABOLIC PANEL: CPT | Performed by: INTERNAL MEDICINE

## 2022-02-24 PROCEDURE — 97530 THERAPEUTIC ACTIVITIES: CPT

## 2022-02-24 PROCEDURE — 85025 COMPLETE CBC W/AUTO DIFF WBC: CPT | Performed by: INTERNAL MEDICINE

## 2022-02-24 PROCEDURE — 94761 N-INVAS EAR/PLS OXIMETRY MLT: CPT

## 2022-02-24 PROCEDURE — 99239 HOSP IP/OBS DSCHRG MGMT >30: CPT | Mod: ,,, | Performed by: INTERNAL MEDICINE

## 2022-02-24 PROCEDURE — 99239 PR HOSPITAL DISCHARGE DAY,>30 MIN: ICD-10-PCS | Mod: ,,, | Performed by: INTERNAL MEDICINE

## 2022-02-24 PROCEDURE — 25000003 PHARM REV CODE 250: Performed by: INTERNAL MEDICINE

## 2022-02-24 PROCEDURE — 36415 COLL VENOUS BLD VENIPUNCTURE: CPT | Performed by: INTERNAL MEDICINE

## 2022-02-24 RX ORDER — LOSARTAN POTASSIUM 25 MG/1
25 TABLET ORAL DAILY
Qty: 90 TABLET | Refills: 3 | Status: SHIPPED | OUTPATIENT
Start: 2022-02-24 | End: 2022-03-03

## 2022-02-24 RX ORDER — LOSARTAN POTASSIUM 25 MG/1
12.5 TABLET ORAL DAILY
Qty: 45 TABLET | Refills: 3 | Status: SHIPPED | OUTPATIENT
Start: 2022-02-24 | End: 2022-02-24 | Stop reason: SDUPTHER

## 2022-02-24 RX ORDER — METOPROLOL SUCCINATE 25 MG/1
25 TABLET, EXTENDED RELEASE ORAL DAILY
Qty: 30 TABLET | Refills: 11 | Status: SHIPPED | OUTPATIENT
Start: 2022-02-24 | End: 2022-03-03 | Stop reason: SDUPTHER

## 2022-02-24 RX ORDER — SPIRONOLACTONE 25 MG/1
12.5 TABLET ORAL DAILY
Qty: 15 TABLET | Refills: 11 | Status: SHIPPED | OUTPATIENT
Start: 2022-02-25 | End: 2022-03-03 | Stop reason: SDUPTHER

## 2022-02-24 RX ADMIN — METOPROLOL TARTRATE 25 MG: 25 TABLET, FILM COATED ORAL at 09:02

## 2022-02-24 RX ADMIN — CLOPIDOGREL 75 MG: 75 TABLET, FILM COATED ORAL at 09:02

## 2022-02-24 RX ADMIN — ASPIRIN 81 MG CHEWABLE TABLET 81 MG: 81 TABLET CHEWABLE at 09:02

## 2022-02-24 RX ADMIN — ATORVASTATIN CALCIUM 80 MG: 20 TABLET, FILM COATED ORAL at 09:02

## 2022-02-24 NOTE — DISCHARGE SUMMARY
[facility] - Cardiology Stepdown  Cardiology  Discharge Summary      Patient Name: Mirtha Jewell  MRN: [id number]  Admission Date: 2/22/2022  Hospital Length of Stay: 2 days  Discharge Date and Time:  02/24/2022 10:38 AM  Attending Physician: Mai att. providers found    Discharging Provider: Ranjith Rocha MD  Primary Care Physician: Primary Doctor Mai    HPI:   Patient is a 61-year-old female with coronary risk factors of 20 pack-year smoking history and continues to vape.  She also notes daily marijuana abuse.  No prior cardiac history.  She does have a family history of her father having MI in his 40s.  Patient presents with 3 days of epigastric and substernal chest discomfort.  She describes her discomfort as a pressure in her chest that is constant.  Her pain is unaffected by ambulation.  She never had this pain before.  No prior stress testing.  In the ED noted to have a troponin elevated to 2. She was given 3 sublingual nitroglycerines which improved her chest pain.  Started on aspirin, Plavix, heparin drip.  Consult Interventional Cardiology for consideration for angiogram for NSTEMI.       Procedure(s) (LRB):  Left heart cath (Left)  Stent, Drug Eluting, Multi Vessel, Coronary     Indwelling Lines/Drains at time of discharge:  Lines/Drains/Airways     None                 Hospital Course:  Patient admitted with chest pain and abdominal pain, found to have NSTEMI, underwent LHC found to have 2 vessel disease s/p 2 sent placements (see details below). For her abdominal pain she underwent CT A/P which was unremarkable. She does report ~20 lbs weight loss in the last 2 weeks associated with poor appetite. She will follow with PCP for this. TTE was found to have EF 20% so she was started on GDMT. She wal;ked on the hallways without Sob or any other issue. BP have been soft so will start with small dose of losartan and spironolactone. She is stable for discharge today and will f/u with PCP and Cardiology.    [facility]  2/22/22  · two vessel coronary artery disease.  · The pre-procedure left ventricular end diastolic pressure was 30.  · The Prox LAD to Mid LAD lesion was 90% stenosed with 0% stenosis post-intervention.  · A STENT RESOLUTE STEPHANIE 4.0X22MM stent was successfully placed.  · The Mid Cx to Dist Cx lesion was 90% stenosed with 0% stenosis post-intervention.  · A STENT RESOLUTE STEPHANIE 3.0X08MM at 12 SARAH for 10 sec.  · The PCI was successful.  · The estimated blood loss was <50 mL.         Goals of Care Treatment Preferences:  Code Status: Full Code      Consults:   Consults (From admission, onward)        Status Ordering Provider     Inpatient consult to Interventional Cardiology  Once        Provider:  (Not yet assigned)    Completed KORINA MARTÍNEZ     Inpatient consult to Cardiology  Once        Provider:  (Not yet assigned)    Completed KORINA MARTÍNEZ          Vitals:    02/24/22 0809   BP:    Pulse: 72   Resp:    Temp:      Physical Exam  Constitutional:       General: She is not in acute distress.     Appearance: She is well-developed. She is not diaphoretic.   HENT:      Head: Normocephalic and atraumatic.   Eyes:      Pupils: Pupils are equal, round, and reactive to light.   Neck:      Thyroid: No thyromegaly.      Vascular: No JVD.   Cardiovascular:      Heart sounds: No murmur heard.  Pulmonary:      Effort: No respiratory distress.      Breath sounds: Normal breath sounds. No stridor. No wheezing or rales.   Abdominal:      General: There is no distension.   Musculoskeletal:         General: No deformity.   Skin:     Findings: No erythema.   Neurological:      Mental Status: She is alert and oriented to person, place, and time.      Cranial Nerves: No cranial nerve deficit.      Deep Tendon Reflexes: Reflexes are normal and symmetric.        Significant Diagnostic Studies: Labs:   BMP:   Recent Labs   Lab 02/23/22  0445 02/24/22  0446   * 90    138   K 4.2 3.9    102   CO2 20* 26   BUN 19 16    CREATININE 0.6 0.6   CALCIUM 9.2 8.9   , CMP   Recent Labs   Lab 02/23/22  0445 02/24/22  0446    138   K 4.2 3.9    102   CO2 20* 26   * 90   BUN 19 16   CREATININE 0.6 0.6   CALCIUM 9.2 8.9   PROT 6.7 6.2   ALBUMIN 3.1* 2.8*   BILITOT 1.0 0.9   ALKPHOS 54* 51*   AST 30 23   ALT 11 11   ANIONGAP 13 10   ESTGFRAFRICA >60.0 >60.0   EGFRNONAA >60.0 >60.0    and CBC   Recent Labs   Lab 02/23/22  0445 02/24/22  0446   WBC 10.87 9.83   HGB 13.0 11.9*   HCT 40.7 35.3*    281       Pending Diagnostic Studies:     Procedure Component Value Units Date/Time    X-Ray Chest AP Portable [[id number]] Resulted: 02/24/22 1019    Order Status: Sent Lab Status: In process Updated: 02/24/22 1019          Final Active Diagnoses:    Diagnosis Date Noted POA    PRINCIPAL PROBLEM:  NSTEMI (non-ST elevated myocardial infarction) [I21.4] 02/22/2022 Yes    HFrEF (heart failure with reduced ejection fraction) [I50.20] 02/23/2022 Unknown    Tobacco abuse [Z72.0] 02/22/2022 Yes     Chronic    Cachexia [R64] 02/22/2022 Yes     Chronic    Marijuana dependence [F12.20] 02/22/2022 Yes     Chronic      Problems Resolved During this Admission:     No new Assessment & Plan notes have been filed under this hospital service since the last note was generated.  Service: Cardiology      Discharged Condition: good    Disposition: Home or Self Care    Follow Up:    Patient Instructions:   No discharge procedures on file.  Medications:  Reconciled Home Medications:      Medication List      START taking these medications    aspirin 81 MG Chew  Chew and swallow 1 tablet (81 mg total) by mouth once daily.  Replaces: aspirin 325 MG tablet     atorvastatin 80 MG tablet  Commonly known as: LIPITOR  Take 1 tablet (80 mg total) by mouth once daily.     clopidogreL 75 mg tablet  Commonly known as: PLAVIX  Take 1 tablet (75 mg total) by mouth once daily.     losartan 25 MG tablet  Commonly known as: COZAAR  Take 1 tablet (25 mg total)  by mouth once daily.     metoprolol succinate 25 MG 24 hr tablet  Commonly known as: TOPROL-XL  Take 1 tablet (25 mg total) by mouth once daily.     spironolactone 25 MG tablet  Commonly known as: ALDACTONE  Take 0.5 tablets (12.5 mg total) by mouth once daily.  Start taking on: February 25, 2022        CONTINUE taking these medications    alendronate 70 MG tablet  Commonly known as: FOSAMAX  Take 1 tablet (70 mg total) by mouth every 7 days.     HYDROcodone-acetaminophen 5-325 mg per tablet  Commonly known as: NORCO  TK 1 T PO  Q 8 H PRN        STOP taking these medications    aspirin 325 MG tablet  Replaced by: aspirin 81 MG Chew     meloxicam 7.5 MG tablet  Commonly known as: MOBIC            Time spent on the discharge of patient: 30 minutes    Ranjith Rocha MD  Cardiology  [facility] - Cardiology Stepdown

## 2022-02-24 NOTE — NURSING
Writer called CCU on call re: bp of 88/50 with map of 66. Pt is asymptomatic. No new orders were given. 2100 bp med was held due to bp of 104/68. Cont with POC.

## 2022-02-24 NOTE — PLAN OF CARE
[facility] - Cardiology Stepdown  Initial Discharge Assessment       Primary Care Provider: Noemi Santana MD    Admission Diagnosis: NSTEMI (non-ST elevated myocardial infarction) [I21.4]  Chest pain [R07.9]    Admission Date: 2/22/2022  Expected Discharge Date: 2/24/2022    Discharge Barriers Identified: None    Payor: BLUE CROSS BLUE SHIELD / Plan: [place] HMO / Product Type: HMO /     Extended Emergency Contact Information  Primary Emergency Contact: Claudette Jewell  Mobile Phone: [phone]  Relation: Daughter  Preferred language: English   needed? No  Secondary Emergency Contact: Bennie Jewell  Mobile Phone: [phone]  Relation: Spouse    Discharge Plan A: Home with family  Discharge Plan B: Home, Home with family      [id number] [place], [address] AT [address]  [place] [address]  Phone: [phone] Fax: [phone]      Initial Assessment (most recent)     Adult Discharge Assessment - 02/24/22 1107        Discharge Assessment    Assessment Type Discharge Planning Assessment     Confirmed/corrected address, phone number and insurance Yes     Confirmed Demographics Correct on Facesheet     Source of Information patient     Communicated TEZ with patient/caregiver Yes     Lives With spouse     Do you expect to return to your current living situation? Yes     Do you have help at home or someone to help you manage your care at home? Yes     Who are your caregiver(s) and their phone number(s)? Bennie Jewell [phone]     Prior to hospitilization cognitive status: Alert/Oriented     Current cognitive status: Alert/Oriented     Walking or Climbing Stairs Difficulty none     Dressing/Bathing Difficulty none     Equipment Currently Used at Home none     Readmission within 30 days? No     Patient currently being followed by outpatient case management? No     Do you currently have service(s) that help you manage your care at home? No      Do you take prescription medications? Yes     Do you have prescription coverage? Yes     Coverage BCBS     Do you have any problems affording any of your prescribed medications? TBD     Who is going to help you get home at discharge?      How do you get to doctors appointments? family or friend will provide;car, drives self     Are you on dialysis? No     Do you take coumadin? No     Discharge Plan A Home with family     Discharge Plan B Home;Home with family     DME Needed Upon Discharge  none     Discharge Plan discussed with: Patient;Spouse/sig other     Discharge Barriers Identified None                      Pt admitted 2/22/2022  6:12 AM    For NSTEMI (non-ST elevated myocardial infarction) [I21.4]  Chest pain [R07.9]       CM to bedside for assessment. Information obtained from patient.  Pt lives with  in house. Family will bring patient home at discharge. Will have support from family at d/c. Anticipate d/c to home with no needs.    PCP is Noemi Santana MD  [phone] (p)  [phone] (f)      Payor: BLUE CROSS BLUE SHIELD / Plan: [place] CheckBonusO / Product Type: CheckBonusO /       [id number] [address] AT [address] [address]  Phone: [phone] Fax: [phone]      Extended Emergency Contact Information  Primary Emergency Contact: Claudette Jewell  Mobile Phone: [phone]  Relation: Daughter  Preferred language: English   needed? No  Secondary Emergency Contact: Bennie Jewell  Mobile Phone: [phone]  Relation: Spouse    Future Appointments   Date Time Provider Department Center   3/3/2022  8:40 AM Андрей Park MD [facility] [address] Julie Haase RN  Case Management [phone]

## 2022-02-24 NOTE — PT/OT/SLP EVAL
"Physical Therapy Evaluation,   Treatment and Discharge Note    Patient Name:  Mirtha Jewell   MRN:  [id number]    Recommendations:     Discharge Recommendations:  home, with cardiac rehab   Discharge Equipment Recommendations: none   Barriers to discharge: None    Assessment:     Mirtha Jewell is a 61 y.o. female admitted with a medical diagnosis of NSTEMI (non-ST elevated myocardial infarction).  At this time, patient is tolerating mobilization well and does not require further acute PT services. Discussed recommendations regarding pacing of activities, energy conservation techniques, and therapeutic exercise. Pt verbalized understanding and reported no further questions.     Recent Surgery: Procedure(s) (LRB):  Left heart cath (Left)  Stent, Drug Eluting, Multi Vessel, Coronary 2 Days Post-Op    Plan:     During this hospitalization, patient does not require further acute PT services.  Please re-consult if situation changes.      Subjective     Chief Complaint: endurance deficits   Patient/Family Comments/goals: "I'd like to make lifestyle changes, I need to know more about what I should eat and what I should avoid"   Pain/Comfort:  · Pain Rating 1: 0/10  · Pain Rating Post-Intervention 1: 0/10    Patients cultural, spiritual, Voodoo conflicts given the current situation: no    Living Environment:  Pt reported living with spouse in [facility].   Prior to admission, patients level of function was (I) with mobility and ADLs.  Equipment used at home: none.  DME owned (not currently used): none.  Upon discharge, patient will have assistance from spouse.    Objective:     Communicated with RN prior to session.  Patient found HOB elevated with telemetry upon PT entry to room. Spouse present at bedside.     General Precautions: Standard    Orthopedic Precautions:N/A   Braces: N/A   Respiratory Status: Room air    Exams:  · Cognitive Exam:  Patient is AOx4  · Postural Exam:  Patient presented with the following abnormalities:  "   · -       No postural abnormalities identified  · Sensation:    · -       Intact  · Skin Integrity/Edema:      · -       Skin integrity: Visible skin intact  · -       Edema: None noted BLEs  · RLE ROM: WFL  · RLE Strength: WFL  · LLE ROM: WFL  · LLE Strength: WFL    Functional Mobility:    Bed Mobility:   · Supine > Sit: independence  · Sit > Supine: independence    Transfers:   · Sit <> Stand Transfer: independence from EOB without AD    Balance:   · Sitting balance: NORMAL: No deviations seen in posture held dynamically  · Standing balance:   · NORMAL: No deviations seen in posture held statically  · NORMAL: No deviations seen in posture held dynamically                 Gait:  · Distance: ~400 ft.    · Assistive Device: no AD   · Assistance Level: independence   · Gait Assessment: reciprocal gait pattern with no acute deficits identified; no LOB. Pt reported mild fatigue at end of trial, but required no seated or standing rest breaks throughout.       AM-PAC 6 CLICK MOBILITY  Total Score:24       Therapeutic Activities and Exercises:   Pt educated on:  - Role of PT and therapy recommendations   - Safety with mobility  - energy conservation techniques   - activity recommendations   - seated and standing therex recommendations   - encouraged to discuss questions re: cardiac diet restrictions/recommendations with medical team   - Instructed to call nursing staff for assistance with mobility as needed 2/2 fall risk     Pt verbalized understanding and expressed no further concerns/questions.     Patient left HOB elevated with all lines intact, call button in reach and RN notified.    GOALS:   Multidisciplinary Problems     Physical Therapy Goals     Not on file          Multidisciplinary Problems (Resolved)        Problem: Physical Therapy Goal    Goal Priority Disciplines Outcome Goal Variances Interventions   Physical Therapy Goal   (Resolved)     PT, PT/OT Met                     History:     Past Medical  History:   Diagnosis Date    Tobacco abuse 2022       Past Surgical History:   Procedure Laterality Date     SECTION      HYSTERECTOMY      LEFT HEART CATHETERIZATION Left 2022    Procedure: Left heart cath;  Surgeon: Aj Gee MD;  Location: [facility] CATH LAB;  Service: Cardiology;  Laterality: Left;    OOPHORECTOMY      NE REMOVAL OF OVARY/TUBE(S)         Time Tracking:     PT Received On: 22  PT Start Time: 907     PT Stop Time: 923  PT Total Time (min): 16 min     Billable Minutes: Evaluation 8 min and Therapeutic Activity 8 min      2022

## 2022-02-24 NOTE — PLAN OF CARE
Pt waiting to be seen by MD Maribel and then pt will be ready for D/C once medications delivered. Will remove PIV and telemetry right before pt leaves. Plan of care discussed with patient. Patient is free of fall/trauma/injury. Denies CP, SOB, or pain/discomfort. All questions addressed. Will continue to monitor

## 2022-02-24 NOTE — PLAN OF CARE
[facility] - Cardiology Stepdown  Discharge Final Note    Primary Care Provider: Noemi Santana MD    Expected Discharge Date: 2/24/2022    Final Discharge Note (most recent)     Final Note - 02/24/22 1404        Final Note    Assessment Type Final Discharge Note     Anticipated Discharge Disposition Home or Self Care     Hospital Resources/Appts/Education Provided Provided patient/caregiver with written discharge plan information;Appointments scheduled and added to AVS                 Important Message from Medicare             Contact Info     Андрей Park MD   Specialty: Cardiology    [address]  [facility] [address]   Phone: [phone]       Next Steps: Go on 3/3/2022    Instructions: at 8:40 for hospital follow up    Noemi Santana MD   Specialty: Internal Medicine   Relationship: PCP - General    [address] [place] [address] [place] [address]   Phone: [phone]       Next Steps: Schedule an appointment as soon as possible for a visit in 1 week(s)          Pt d/c home with no needs.    Future Appointments   Date Time Provider Department Center   3/3/2022  8:40 AM Андрей Park MD [facility] CARDIO [address] Julie Haase RN  Case Management [phone]

## 2022-02-24 NOTE — PLAN OF CARE
PT evaluation completed- see note for details. No acute functional deficits identified upon evaluation. Once medically stable, recommending pt discharge home. Ms. Jewell may benefit from cardiac rehab referral upon discharge to maximize endurance and activity tolerance following NSTEMI (referral decision deferred to attending medical team).      2/24/2022

## 2022-02-24 NOTE — NURSING
Pt has medications. PIV and telemetry removed. Maribel saw pt.  Pt left on own with  as they were anxious to leave.  Pt completely stable.

## 2022-02-24 NOTE — PROGRESS NOTES
Occupational Therapy  Screen  Mirtha Jewell  MRN: [id number]    Pt is performing ADL safely and without assistance. No skilled OT services warranted at this time.         Jolene Dunn, AALIYAHR  2/24/2022

## 2022-02-25 NOTE — PHYSICIAN QUERY
PT Name: Mirtha Jewell  MR #: [id number]     DOCUMENTATION CLARIFICATION     CDS/: Rachel Malin RN CDIS              Contact information: [email or web address]    This form is a permanent document in the medical record.     Query Date: February 25, 2022    By submitting this query, we are merely seeking further clarification of documentation.  Please utilize your independent clinical judgment when addressing the question(s) below.    The Medical Record contains the following   Indicators Supporting Clinical Findings Location in Medical Record   x Heart Failure documented HFrEF (heart failure with reduced ejection fraction)    HFrEF (heart failure with reduced ejection fraction)  Newly diagnosed HFrEF  Likely 2/2 ischemia but substance abuse could also be contributing.      Plan:   Optimize GDMT  - Metoprolol 25mg po BID- will switch to succinate in AM  - Losartan 50 mg qd  - Spironolactone 25 mg qd  - Cardiology rehab Discharge summary    Cards note 2/23     x BNP 02/22/22 06:59  BNP: 171 (H) (Reference Range & Units 0 - 99 pg/mL)  Labs    x EF/Echo The left ventricle is mildly enlarged with eccentric hypertrophy and severely decreased systolic function. The estimated ejection fraction is 20%.  There are segmental left ventricular wall motion abnormalities. Only base of the heart has preserved wall motion,  Left ventricular diastolic dysfunction.  Normal right ventricular size with normal right ventricular systolic function.  Mild left atrial enlargement.  Intermediate central venous pressure (8 mmHg).     TTE 2/23     Radiology findings      Subjective/Objective Respiratory Conditions     x Recent/Current MI NSTEMI (non-ST elevated myocardial infarction Discharge summary     Heart Transplant, LVAD      Edema, JVD      Ascites     x Diuretics/Meds furosemide injection 20 mg  Dose: 20 mg  Freq: Once Route: IV  Start: 02/23/22 1234 End: 02/23/22 1345 MAR    Other Treatment     x Other EDP (Pre):30 mmHG Cath  report 2/22      Heart failure is a clinical diagnosis which includes symptomatic fluid retention, elevated intracardiac pressures, and/or the inability of the heart to deliver adequate blood flow.    Heart Failure with reduced Ejection Fraction (HFrEF) or Systolic Heart Failure (loses ability to contract normally, EF is <40%)    Heart Failure with preserved Ejection Fraction (HFpEF) or Diastolic Heart Failure (stiff ventricles, does not relax properly, EF is >50%)     Heart Failure with Combined Systolic and Diastolic Failure (stiff ventricles, does not relax properly and EF is <50%)    Mid-range or mildly reduced ejection fraction (HFmrEF) is classified as systolic heart failure.   Common clues to acute exacerbation:  Rapidly progressive symptoms (w/in 2 weeks of presentation), using IV diuretics, using supplemental O2, pulmonary edema on Xray, new or worsening pleural effusion, +JVD or other signs of volume overload, MI w/in 4 weeks, and/or BNP >500  The clinical guidelines noted are only system guidelines, and do not replace the providers clinical judgment.    Provider, please specify the diagnosis associated with the above clinical findings.    [  x ]  Acute Systolic Heart Failure (HFrEF or HFmrEF) - new diagnosis   [   ]  Other (please specify): ___________________________________   [  ]  Clinically Undetermined         Please document in your progress notes daily for the duration of treatment until resolved and include in your discharge summary.    References:  American Heart Association editorial staff. (2017, May). Ejection Fraction Heart Failure Measurement. American Heart Association. https://www.heart.org/en/health-topics/heart-failure/diagnosing-heart-failure/ejection-fraction-heart-failure-measurement#:~:text=Ejection%20fraction%20(EF)%20is%20a,pushed%20out%20with%20each%20heartbeat  ILSA Flores (2020, December 15). Heart failure with preserved ejection fraction: Clinical manifestations and  diagnosis. UpToDate. https://www.[employer].com/contents/heart-failure-with-preserved-ejection-fraction-clinical-manifestations-and-diagnosis.  ICD-10-CM/PCS Coding Clinic Third Quarter ICD-10, Effective with discharges: September 8, 2020 [facility] [employer] § Heart failure with mid-range or mildly reduced ejection fraction (2020).  Form No. 70388

## 2022-03-03 ENCOUNTER — OFFICE VISIT (OUTPATIENT)
Dept: CARDIOLOGY | Facility: CLINIC | Age: 62
End: 2022-03-03
Payer: COMMERCIAL

## 2022-03-03 VITALS
HEIGHT: 67 IN | DIASTOLIC BLOOD PRESSURE: 71 MMHG | WEIGHT: 99 LBS | SYSTOLIC BLOOD PRESSURE: 119 MMHG | BODY MASS INDEX: 15.54 KG/M2 | HEART RATE: 62 BPM

## 2022-03-03 DIAGNOSIS — F12.20 MARIJUANA DEPENDENCE: Chronic | ICD-10-CM

## 2022-03-03 DIAGNOSIS — Z72.0 TOBACCO ABUSE: Chronic | ICD-10-CM

## 2022-03-03 DIAGNOSIS — I21.4 NSTEMI (NON-ST ELEVATED MYOCARDIAL INFARCTION): ICD-10-CM

## 2022-03-03 DIAGNOSIS — I70.0 AORTIC CALCIFICATION: ICD-10-CM

## 2022-03-03 DIAGNOSIS — R64 CACHEXIA: Chronic | ICD-10-CM

## 2022-03-03 DIAGNOSIS — I50.20 HFREF (HEART FAILURE WITH REDUCED EJECTION FRACTION): Primary | ICD-10-CM

## 2022-03-03 PROCEDURE — 99214 PR OFFICE/OUTPT VISIT, EST, LEVL IV, 30-39 MIN: ICD-10-PCS | Mod: S$GLB,,, | Performed by: INTERNAL MEDICINE

## 2022-03-03 PROCEDURE — 99214 OFFICE O/P EST MOD 30 MIN: CPT | Mod: S$GLB,,, | Performed by: INTERNAL MEDICINE

## 2022-03-03 PROCEDURE — 1111F DSCHRG MED/CURRENT MED MERGE: CPT | Mod: CPTII,S$GLB,, | Performed by: INTERNAL MEDICINE

## 2022-03-03 PROCEDURE — 1111F PR DISCHARGE MEDS RECONCILED W/ CURRENT OUTPATIENT MED LIST: ICD-10-PCS | Mod: CPTII,S$GLB,, | Performed by: INTERNAL MEDICINE

## 2022-03-03 PROCEDURE — 99213 OFFICE O/P EST LOW 20 MIN: CPT | Mod: PBBFAC | Performed by: INTERNAL MEDICINE

## 2022-03-03 PROCEDURE — 99999 PR PBB SHADOW E&M-EST. PATIENT-LVL III: CPT | Mod: PBBFAC,,, | Performed by: INTERNAL MEDICINE

## 2022-03-03 PROCEDURE — 99999 PR PBB SHADOW E&M-EST. PATIENT-LVL III: ICD-10-PCS | Mod: PBBFAC,,, | Performed by: INTERNAL MEDICINE

## 2022-03-03 RX ORDER — METOPROLOL SUCCINATE 25 MG/1
25 TABLET, EXTENDED RELEASE ORAL DAILY
Qty: 90 TABLET | Refills: 3 | Status: SHIPPED | OUTPATIENT
Start: 2022-03-03 | End: 2023-03-03

## 2022-03-03 RX ORDER — LOSARTAN POTASSIUM 50 MG/1
50 TABLET ORAL DAILY
Qty: 90 TABLET | Refills: 3 | Status: SHIPPED | OUTPATIENT
Start: 2022-03-03 | End: 2023-02-13 | Stop reason: SDUPTHER

## 2022-03-03 RX ORDER — SPIRONOLACTONE 25 MG/1
12.5 TABLET ORAL DAILY
Qty: 45 TABLET | Refills: 3 | Status: SHIPPED | OUTPATIENT
Start: 2022-03-03 | End: 2023-03-03

## 2022-03-03 RX ORDER — ATORVASTATIN CALCIUM 80 MG/1
80 TABLET, FILM COATED ORAL DAILY
Qty: 90 TABLET | Refills: 3 | Status: SHIPPED | OUTPATIENT
Start: 2022-03-03 | End: 2023-03-03

## 2022-03-03 RX ORDER — CLOPIDOGREL BISULFATE 75 MG/1
75 TABLET ORAL DAILY
Qty: 30 TABLET | Refills: 11 | Status: SHIPPED | OUTPATIENT
Start: 2022-03-03 | End: 2023-03-03

## 2022-03-03 NOTE — PATIENT INSTRUCTIONS
- Increase losartan to 50 mg daily (two pills for now until you get your new bottle)  - Take atorvastatin and spironolactone at night

## 2022-03-03 NOTE — PROGRESS NOTES
Cardiology Clinic Note  Reason for Visit: NSTEMI (non-ST elevated myocardial infarction) (Hospital f/u 22)    HPI:     60 y/o F with CAD s/p recent NSTEMI and PCI to LAD and LCx, HFrEF 2/2 ICM (EF 20%), h/o tobacco abuse. Presents for hospital discharge follow up. Admitted from  to  with NSTEMI and underwent PCI x2, newly diagnosed with HF. Started on GDMT. Doing well since discharge. She is active and denies SOB, chest pain, lightheadedness, dizziness or syncope. Checks her BP at home and now hypotensive episodes. Has not smoked again since her hospital stay.     ROS:    Review of Systems   Constitutional: Negative for chills and fever.   Cardiovascular: Negative for chest pain, dyspnea on exertion, palpitations and syncope.   Respiratory: Negative for cough and sleep disturbances due to breathing.    Musculoskeletal: Negative for back pain.   Gastrointestinal: Negative for abdominal pain, nausea and vomiting.   Neurological: Negative for dizziness and focal weakness.   Psychiatric/Behavioral: Negative for altered mental status.     PMH:     Past Medical History:   Diagnosis Date    Tobacco abuse 2022     Past Surgical History:   Procedure Laterality Date     SECTION      HYSTERECTOMY      LEFT HEART CATHETERIZATION Left 2022    Procedure: Left heart cath;  Surgeon: Aj Gee MD;  Location: [facility] CATH LAB;  Service: Cardiology;  Laterality: Left;    OOPHORECTOMY      AK REMOVAL OF OVARY/TUBE(S)       Allergies:   Review of patient's allergies indicates:  No Known Allergies  Medications:     Current Outpatient Medications on File Prior to Visit   Medication Sig Dispense Refill    aspirin 81 MG Chew Chew and swallow 1 tablet (81 mg total) by mouth once daily. 30 tablet 11    atorvastatin (LIPITOR) 80 MG tablet Take 1 tablet (80 mg total) by mouth once daily. 90 tablet 3    clopidogreL (PLAVIX) 75 mg tablet Take 1 tablet (75 mg total) by mouth once daily. 30  "tablet 11    HYDROcodone-acetaminophen (NORCO) 5-325 mg per tablet TK 1 T PO  Q 8 H PRN  0    losartan (COZAAR) 25 MG tablet Take 1 tablet (25 mg total) by mouth once daily. 90 tablet 3    metoprolol succinate (TOPROL-XL) 25 MG 24 hr tablet Take 1 tablet (25 mg total) by mouth once daily. 30 tablet 11    spironolactone (ALDACTONE) 25 MG tablet Take 0.5 tablets (12.5 mg total) by mouth once daily. 15 tablet 11    [DISCONTINUED] alendronate (FOSAMAX) 70 MG tablet Take 1 tablet (70 mg total) by mouth every 7 days. (Patient not taking: Reported on 3/3/2022) 4 tablet 9     No current facility-administered medications on file prior to visit.     Social History:     Social History     Tobacco Use    Smoking status: Former Smoker    Smokeless tobacco: Never Used   Substance Use Topics    Alcohol use: Not Currently     Family History:     Family History   Problem Relation Age of Onset    Heart attack Father     Breast cancer Paternal Aunt      Physical Exam:   /71 (BP Location: Left arm, Patient Position: Sitting, BP Method: Medium (Automatic))   Pulse 62   Ht 5' 7" (1.702 m)   Wt 44.9 kg (98 lb 15.8 oz)   BMI 15.50 kg/m²      Physical Exam  Constitutional:       General: She is not in acute distress.     Appearance: She is well-developed. She is not diaphoretic.   HENT:      Head: Normocephalic and atraumatic.   Eyes:      Conjunctiva/sclera: Conjunctivae normal.   Neck:      Trachea: No tracheal deviation.   Cardiovascular:      Rate and Rhythm: Normal rate and regular rhythm.      Heart sounds: Normal heart sounds. No murmur heard.  Pulmonary:      Effort: Pulmonary effort is normal. No respiratory distress.      Breath sounds: Normal breath sounds. No wheezing.   Abdominal:      General: Bowel sounds are normal. There is no distension.      Palpations: Abdomen is soft.      Tenderness: There is no abdominal tenderness.   Musculoskeletal:         General: Normal range of motion.      Cervical back: " Normal range of motion.   Neurological:      Mental Status: She is alert and oriented to person, place, and time.         Labs:     Lab Results   Component Value Date     02/24/2022    K 3.9 02/24/2022     02/24/2022    CO2 26 02/24/2022    BUN 16 02/24/2022    CREATININE 0.6 02/24/2022    CREATININE 0.67 05/01/2019    ANIONGAP 10 02/24/2022     No results found for: HGBA1C  Lab Results   Component Value Date     (H) 02/22/2022    Lab Results   Component Value Date    WBC 9.83 02/24/2022    HGB 11.9 (L) 02/24/2022    HCT 35.3 (L) 02/24/2022     02/24/2022    GRAN 6.3 02/24/2022    GRAN 64.3 02/24/2022     Lab Results   Component Value Date    CHOL 155 02/23/2022    HDL 60 02/23/2022    LDLCALC 78.0 02/23/2022    TRIG 85 02/23/2022          Imaging:     TTE 2/23/22  · The left ventricle is mildly enlarged with eccentric hypertrophy and severely decreased systolic function. The estimated ejection fraction is 20%.  · There are segmental left ventricular wall motion abnormalities. Only base of the heart has preserved wall motion,  · Left ventricular diastolic dysfunction.  · Normal right ventricular size with normal right ventricular systolic function.  · Mild left atrial enlargement.  · Intermediate central venous pressure (8 mmHg).        Assessment:     1. HFrEF (heart failure with reduced ejection fraction)    2. NSTEMI (non-ST elevated myocardial infarction)    3. Aortic calcification    4. Marijuana dependence    5. Tobacco abuse    6. Cachexia        Plan:   HFrEF (heart failure with reduced ejection fraction)  - Secondary to ICM. Not wearing LifeVest. Will reassess EF in 3 months and refer to EP for ICD if it remains below 20%.  - GDMT   - Increase losartan to 50 mg daily   - Continue spironolactone 12.5 mg daily   - Continue metoprolol XL 25 mg daily (HR is already in 60s)      NSTEMI (non-ST elevated myocardial infarction)  CAD  - On medical therapy with aspirin,plavix and high  intensity statin  - Needs DAPT until 2/22/23 (at least)    Aortic calcification  - On statin    Marijuana dependence    Tobacco abuse  - Quit smoking since her hospital stay    Cachexia  - Discussed healthy diet      Discussed with Dr. Raymond. RTC in 3 months.     Андрей Park  Cardiology Fellow, PGY6

## 2022-03-03 NOTE — PROGRESS NOTES
I have reviewed the notes, assessments, and/or procedures performed by Dr Park, I concur with her/his documentation of Mirtha Jewell. Increase losartan. Repeat EF assessment in 3 months - may need ICD.

## 2022-03-07 ENCOUNTER — TELEPHONE (OUTPATIENT)
Dept: CARDIOLOGY | Facility: CLINIC | Age: 62
End: 2022-03-07
Payer: COMMERCIAL

## 2022-03-07 NOTE — TELEPHONE ENCOUNTER
----- Message from Андрей Park MD sent at 3/7/2022  4:27 PM CST -----  Regarding: RE: Rash  Hello,     I really don't think the losartan would do it since she has been taking it for a while. I think she should continue taking it. If the rash does not bother her, does not feel anything else like SOB or itching in her throat... she can continue to monitor and see if it looks better in a week or so. Probably something she ate.     ----- Message -----  From: Laureen Saleh RN  Sent: 3/7/2022   3:47 PM CST  To: Laureen Salhe RN, Андрей Park MD  Subject: Rash                                             Pt increased her losartan from 25 to 50 on 3/5 and has been having a rash on legs and arm since 3/6.  Rash does not itch and she denies any other issue.  She admits to having started the mediterranean diet and therefore eating foods that she normally does not eat.  She denies any other change such as new med, cleaning product...    She wonders if the losartan could be causing the rash, or some new food she tried.    Please advise,      ----- Message -----  From: Casie Hurtado MA  Sent: 3/7/2022   3:11 PM CST  To: ROCHELLE Bartlett the patent need to talk to you about her medication Losartan 50 she have a rash on her legs and arm last visit was on 3-3-2022   please call [phone]. Thank you.

## 2022-03-17 ENCOUNTER — TELEPHONE (OUTPATIENT)
Dept: CARDIOLOGY | Facility: CLINIC | Age: 62
End: 2022-03-17
Payer: COMMERCIAL

## 2022-03-17 NOTE — TELEPHONE ENCOUNTER
----- Message from Shantel Mandel sent at 3/17/2022  8:24 AM CDT -----  Regarding: Medication  Pt [phone] thinks she's having side effects from her Plavix 75 mg. She's experiencing bad stomach pains.    LOV 3/3/22 Dr. Park    Thanks

## 2022-03-17 NOTE — TELEPHONE ENCOUNTER
Pt c/o epigastric pain, occ extending to below umbilicus. It started night before last when lying done and it occurred again, milder , during the day. It happened again last night. She denies change in color or smell of stools. She is c/o constipation.   Teaching done on why Plavix was prescribed. I told her to f/u w. PCP due to no obvious sign of bleeding for further evaluation . She verbalized understanding.

## 2023-02-10 NOTE — PLAN OF CARE
Recv'd pt AAO, in bed with  at bedside. Pt with c/o back pain 7/10, no nausea reported at this time. VSS PIV patent and flushed. SR on monitor. Meds given and davi well. Will cont with POC and to monitor.   0-6 days ([facility])

## 2024-11-01 ENCOUNTER — OFFICE VISIT (OUTPATIENT)
Dept: URGENT CARE | Facility: CLINIC | Age: 64
End: 2024-11-01
Payer: MEDICAID

## 2024-11-01 VITALS
WEIGHT: 98 LBS | RESPIRATION RATE: 14 BRPM | TEMPERATURE: 98 F | SYSTOLIC BLOOD PRESSURE: 107 MMHG | HEART RATE: 60 BPM | BODY MASS INDEX: 15.38 KG/M2 | OXYGEN SATURATION: 97 % | DIASTOLIC BLOOD PRESSURE: 73 MMHG | HEIGHT: 67 IN

## 2024-11-01 DIAGNOSIS — S61.412A LACERATION OF LEFT HAND WITHOUT FOREIGN BODY, INITIAL ENCOUNTER: Primary | ICD-10-CM

## 2024-11-01 RX ORDER — MUPIROCIN 20 MG/G
OINTMENT TOPICAL 3 TIMES DAILY
Qty: 22 G | Refills: 0 | Status: SHIPPED | OUTPATIENT
Start: 2024-11-01

## 2024-11-01 RX ORDER — MUPIROCIN 20 MG/G
OINTMENT TOPICAL
Status: COMPLETED | OUTPATIENT
Start: 2024-11-01 | End: 2024-11-01

## 2024-11-01 RX ADMIN — MUPIROCIN: 20 OINTMENT TOPICAL at 06:11

## 2025-08-14 ENCOUNTER — PATIENT OUTREACH (OUTPATIENT)
Dept: ADMINISTRATIVE | Facility: OTHER | Age: 65
End: 2025-08-14
Payer: MEDICARE

## 2025-08-14 ENCOUNTER — OFFICE VISIT (OUTPATIENT)
Facility: CLINIC | Age: 65
End: 2025-08-14
Payer: MEDICARE

## 2025-08-14 VITALS
HEART RATE: 51 BPM | SYSTOLIC BLOOD PRESSURE: 110 MMHG | HEIGHT: 67 IN | DIASTOLIC BLOOD PRESSURE: 72 MMHG | OXYGEN SATURATION: 97 % | RESPIRATION RATE: 18 BRPM | TEMPERATURE: 98 F | WEIGHT: 127.44 LBS | BODY MASS INDEX: 20 KG/M2

## 2025-08-14 DIAGNOSIS — F51.01 PRIMARY INSOMNIA: ICD-10-CM

## 2025-08-14 DIAGNOSIS — M25.512 PAIN IN JOINT OF LEFT SHOULDER: ICD-10-CM

## 2025-08-14 DIAGNOSIS — Z13.820 SCREENING FOR OSTEOPOROSIS: ICD-10-CM

## 2025-08-14 DIAGNOSIS — Z76.89 ENCOUNTER TO ESTABLISH CARE WITH NEW PROVIDER: Primary | ICD-10-CM

## 2025-08-14 DIAGNOSIS — T78.40XD ALLERGY, SUBSEQUENT ENCOUNTER: ICD-10-CM

## 2025-08-14 DIAGNOSIS — E78.2 MIXED HYPERLIPIDEMIA: ICD-10-CM

## 2025-08-14 DIAGNOSIS — R73.03 PREDIABETES: ICD-10-CM

## 2025-08-14 DIAGNOSIS — R79.9 ABNORMAL FINDING OF BLOOD CHEMISTRY, UNSPECIFIED: ICD-10-CM

## 2025-08-14 DIAGNOSIS — I50.20 HFREF (HEART FAILURE WITH REDUCED EJECTION FRACTION): ICD-10-CM

## 2025-08-14 DIAGNOSIS — F41.9 ANXIETY: ICD-10-CM

## 2025-08-14 DIAGNOSIS — L82.1 SEBORRHEIC KERATOSIS: ICD-10-CM

## 2025-08-14 DIAGNOSIS — M81.0 AGE-RELATED OSTEOPOROSIS WITHOUT CURRENT PATHOLOGICAL FRACTURE: ICD-10-CM

## 2025-08-14 DIAGNOSIS — K50.00 CROHN'S DISEASE OF SMALL INTESTINE WITHOUT COMPLICATION: ICD-10-CM

## 2025-08-14 DIAGNOSIS — F12.20 MARIJUANA DEPENDENCE: ICD-10-CM

## 2025-08-14 PROBLEM — Z87.891 FORMER SMOKER: Status: ACTIVE | Noted: 2023-05-11

## 2025-08-14 PROBLEM — K50.90 CROHN'S DISEASE: Status: ACTIVE | Noted: 2025-08-14

## 2025-08-14 PROBLEM — E78.5 HYPERLIPIDEMIA: Status: ACTIVE | Noted: 2023-05-11

## 2025-08-14 PROBLEM — K56.609 SMALL BOWEL OBSTRUCTION: Status: ACTIVE | Noted: 2022-04-29

## 2025-08-14 PROBLEM — Z12.11 ENCOUNTER FOR SCREENING FOR MALIGNANT NEOPLASM OF COLON: Status: ACTIVE | Noted: 2025-08-14

## 2025-08-14 PROBLEM — M19.90 ARTHRITIS: Status: ACTIVE | Noted: 2025-08-14

## 2025-08-14 PROBLEM — M47.896 OTHER SPONDYLOSIS, LUMBAR REGION: Status: ACTIVE | Noted: 2024-04-16

## 2025-08-14 PROBLEM — I25.10 CORONARY ARTERY DISEASE: Status: ACTIVE | Noted: 2022-03-04

## 2025-08-14 PROBLEM — M54.9 BACK PAIN: Status: ACTIVE | Noted: 2025-08-14

## 2025-08-14 PROBLEM — I10 ESSENTIAL HYPERTENSION: Status: ACTIVE | Noted: 2023-05-11

## 2025-08-14 PROCEDURE — 99999 PR PBB SHADOW E&M-EST. PATIENT-LVL V: CPT | Mod: PBBFAC,,,

## 2025-08-14 PROCEDURE — 3074F SYST BP LT 130 MM HG: CPT | Mod: CPTII,S$GLB,,

## 2025-08-14 PROCEDURE — 3078F DIAST BP <80 MM HG: CPT | Mod: CPTII,S$GLB,,

## 2025-08-14 PROCEDURE — 1159F MED LIST DOCD IN RCRD: CPT | Mod: CPTII,S$GLB,,

## 2025-08-14 PROCEDURE — 3044F HG A1C LEVEL LT 7.0%: CPT | Mod: CPTII,S$GLB,,

## 2025-08-14 PROCEDURE — 3008F BODY MASS INDEX DOCD: CPT | Mod: CPTII,S$GLB,,

## 2025-08-14 PROCEDURE — 99387 INIT PM E/M NEW PAT 65+ YRS: CPT | Mod: S$GLB,,,

## 2025-08-14 PROCEDURE — 4010F ACE/ARB THERAPY RXD/TAKEN: CPT | Mod: CPTII,S$GLB,,

## 2025-08-14 PROCEDURE — 1160F RVW MEDS BY RX/DR IN RCRD: CPT | Mod: CPTII,S$GLB,,

## 2025-08-14 PROCEDURE — 99214 OFFICE O/P EST MOD 30 MIN: CPT | Mod: 25,S$GLB,,

## 2025-08-14 PROCEDURE — 3288F FALL RISK ASSESSMENT DOCD: CPT | Mod: CPTII,S$GLB,,

## 2025-08-14 PROCEDURE — 1101F PT FALLS ASSESS-DOCD LE1/YR: CPT | Mod: CPTII,S$GLB,,

## 2025-08-14 RX ORDER — TRAZODONE HYDROCHLORIDE 50 MG/1
50 TABLET ORAL NIGHTLY PRN
Qty: 30 TABLET | Refills: 0 | Status: SHIPPED | OUTPATIENT
Start: 2025-08-14

## 2025-08-14 RX ORDER — ATORVASTATIN CALCIUM 80 MG/1
80 TABLET, FILM COATED ORAL DAILY
Qty: 90 TABLET | Refills: 3 | Status: SHIPPED | OUTPATIENT
Start: 2025-08-14 | End: 2026-08-14

## 2025-08-14 RX ORDER — TRAZODONE HYDROCHLORIDE 50 MG/1
25-50 TABLET ORAL NIGHTLY PRN
COMMUNITY
Start: 2025-08-09 | End: 2025-08-14 | Stop reason: SDUPTHER

## 2025-08-14 RX ORDER — NAPROXEN 500 MG/1
500 TABLET ORAL 2 TIMES DAILY PRN
Qty: 25 TABLET | Refills: 0 | Status: SHIPPED | OUTPATIENT
Start: 2025-08-14

## 2025-08-14 RX ORDER — TIZANIDINE 4 MG/1
4 TABLET ORAL EVERY 8 HOURS
COMMUNITY
Start: 2025-08-05

## 2025-08-14 RX ORDER — BUSPIRONE HYDROCHLORIDE 10 MG/1
10 TABLET ORAL 2 TIMES DAILY
Qty: 60 TABLET | Refills: 0 | Status: SHIPPED | OUTPATIENT
Start: 2025-08-14 | End: 2025-09-13

## 2025-08-14 RX ORDER — LEVOCETIRIZINE DIHYDROCHLORIDE 5 MG/1
5 TABLET, FILM COATED ORAL NIGHTLY
Qty: 30 TABLET | Refills: 1 | Status: SHIPPED | OUTPATIENT
Start: 2025-08-14 | End: 2025-10-13

## 2025-08-14 RX ORDER — BUDESONIDE 3 MG/1
9 CAPSULE, COATED PELLETS ORAL DAILY
COMMUNITY
Start: 2025-06-13

## 2025-08-14 RX ORDER — BUSPIRONE HYDROCHLORIDE 10 MG/1
5-10 TABLET ORAL DAILY PRN
COMMUNITY
Start: 2025-08-09 | End: 2025-08-14 | Stop reason: SDUPTHER

## 2025-08-14 RX ORDER — LEVOCETIRIZINE DIHYDROCHLORIDE 5 MG/1
5 TABLET, FILM COATED ORAL NIGHTLY
COMMUNITY
Start: 2025-07-23 | End: 2025-08-14 | Stop reason: SDUPTHER

## 2025-08-14 RX ORDER — ALENDRONATE SODIUM 70 MG/1
70 TABLET ORAL
COMMUNITY

## 2025-08-14 RX ORDER — ERGOCALCIFEROL 1.25 MG/1
50000 CAPSULE ORAL
COMMUNITY
Start: 2025-07-29

## 2025-08-15 ENCOUNTER — LAB VISIT (OUTPATIENT)
Dept: LAB | Facility: HOSPITAL | Age: 65
End: 2025-08-15
Payer: MEDICARE

## 2025-08-15 DIAGNOSIS — Z76.89 ENCOUNTER TO ESTABLISH CARE WITH NEW PROVIDER: ICD-10-CM

## 2025-08-15 DIAGNOSIS — R79.9 ABNORMAL FINDING OF BLOOD CHEMISTRY, UNSPECIFIED: ICD-10-CM

## 2025-08-15 DIAGNOSIS — R73.03 PREDIABETES: ICD-10-CM

## 2025-08-15 LAB
ABSOLUTE EOSINOPHIL (OHS): 0.43 K/UL
ABSOLUTE MONOCYTE (OHS): 0.37 K/UL (ref 0.3–1)
ABSOLUTE NEUTROPHIL COUNT (OHS): 3.18 K/UL (ref 1.8–7.7)
ALBUMIN SERPL BCP-MCNC: 3.7 G/DL (ref 3.5–5.2)
ALP SERPL-CCNC: 53 UNIT/L (ref 40–150)
ALT SERPL W/O P-5'-P-CCNC: 25 UNIT/L (ref 10–44)
ANION GAP (OHS): 8 MMOL/L (ref 8–16)
AST SERPL-CCNC: 36 UNIT/L (ref 11–45)
BASOPHILS # BLD AUTO: 0.06 K/UL
BASOPHILS NFR BLD AUTO: 1 %
BILIRUB SERPL-MCNC: 0.6 MG/DL (ref 0.1–1)
BUN SERPL-MCNC: 23 MG/DL (ref 8–23)
CALCIUM SERPL-MCNC: 9.4 MG/DL (ref 8.7–10.5)
CHLORIDE SERPL-SCNC: 107 MMOL/L (ref 95–110)
CHOLEST SERPL-MCNC: 139 MG/DL (ref 120–199)
CHOLEST/HDLC SERPL: 2.2 {RATIO} (ref 2–5)
CO2 SERPL-SCNC: 25 MMOL/L (ref 23–29)
CREAT SERPL-MCNC: 0.7 MG/DL (ref 0.5–1.4)
EAG (OHS): 88 MG/DL (ref 68–131)
ERYTHROCYTE [DISTWIDTH] IN BLOOD BY AUTOMATED COUNT: 12.8 % (ref 11.5–14.5)
GFR SERPLBLD CREATININE-BSD FMLA CKD-EPI: >60 ML/MIN/1.73/M2
GLUCOSE SERPL-MCNC: 93 MG/DL (ref 70–110)
HBA1C MFR BLD: 4.7 % (ref 4–5.6)
HCT VFR BLD AUTO: 33.2 % (ref 37–48.5)
HCV AB SERPL QL IA: NORMAL
HDLC SERPL-MCNC: 64 MG/DL (ref 40–75)
HDLC SERPL: 46 % (ref 20–50)
HGB BLD-MCNC: 11.1 GM/DL (ref 12–16)
HIV 1+2 AB+HIV1 P24 AG SERPL QL IA: NORMAL
IMM GRANULOCYTES # BLD AUTO: 0.01 K/UL (ref 0–0.04)
IMM GRANULOCYTES NFR BLD AUTO: 0.2 % (ref 0–0.5)
LDLC SERPL CALC-MCNC: 60 MG/DL (ref 63–159)
LYMPHOCYTES # BLD AUTO: 2.24 K/UL (ref 1–4.8)
MCH RBC QN AUTO: 29.7 PG (ref 27–31)
MCHC RBC AUTO-ENTMCNC: 33.4 G/DL (ref 32–36)
MCV RBC AUTO: 89 FL (ref 82–98)
NONHDLC SERPL-MCNC: 75 MG/DL
NUCLEATED RBC (/100WBC) (OHS): 0 /100 WBC
PLATELET # BLD AUTO: 210 K/UL (ref 150–450)
PMV BLD AUTO: 10.5 FL (ref 9.2–12.9)
POTASSIUM SERPL-SCNC: 4.5 MMOL/L (ref 3.5–5.1)
PROT SERPL-MCNC: 7.1 GM/DL (ref 6–8.4)
RBC # BLD AUTO: 3.74 M/UL (ref 4–5.4)
RELATIVE EOSINOPHIL (OHS): 6.8 %
RELATIVE LYMPHOCYTE (OHS): 35.6 % (ref 18–48)
RELATIVE MONOCYTE (OHS): 5.9 % (ref 4–15)
RELATIVE NEUTROPHIL (OHS): 50.5 % (ref 38–73)
SODIUM SERPL-SCNC: 140 MMOL/L (ref 136–145)
TRIGL SERPL-MCNC: 75 MG/DL (ref 30–150)
TSH SERPL-ACNC: 0.46 UIU/ML (ref 0.4–4)
WBC # BLD AUTO: 6.29 K/UL (ref 3.9–12.7)

## 2025-08-15 PROCEDURE — 84443 ASSAY THYROID STIM HORMONE: CPT

## 2025-08-15 PROCEDURE — 36415 COLL VENOUS BLD VENIPUNCTURE: CPT | Mod: PN

## 2025-08-15 PROCEDURE — 86803 HEPATITIS C AB TEST: CPT

## 2025-08-15 PROCEDURE — 85025 COMPLETE CBC W/AUTO DIFF WBC: CPT

## 2025-08-15 PROCEDURE — 82465 ASSAY BLD/SERUM CHOLESTEROL: CPT

## 2025-08-15 PROCEDURE — 87389 HIV-1 AG W/HIV-1&-2 AB AG IA: CPT

## 2025-08-15 PROCEDURE — 83036 HEMOGLOBIN GLYCOSYLATED A1C: CPT

## 2025-08-15 PROCEDURE — 82565 ASSAY OF CREATININE: CPT

## 2025-08-18 PROBLEM — Z12.11 ENCOUNTER FOR SCREENING FOR MALIGNANT NEOPLASM OF COLON: Status: RESOLVED | Noted: 2025-08-14 | Resolved: 2025-08-18

## 2025-08-27 ENCOUNTER — TELEPHONE (OUTPATIENT)
Dept: DERMATOLOGY | Facility: CLINIC | Age: 65
End: 2025-08-27
Payer: MEDICARE

## (undated) DEVICE — PROTECTION STATION PLUS

## (undated) DEVICE — GUIDEWIRE SUPRA CORE 035 190CM

## (undated) DEVICE — OMNIPAQUE 350 200ML

## (undated) DEVICE — KIT CUSTOM MANIFOLD

## (undated) DEVICE — HEMOSTAT VASC BAND REG 24CM

## (undated) DEVICE — GUIDE VISTA XB 3.5

## (undated) DEVICE — KIT CO-PILOT

## (undated) DEVICE — GUIDE WIRE BMW 014 X190

## (undated) DEVICE — WIRE BENTSON 035/180

## (undated) DEVICE — CATH NC QUANTUM APEX MR 4X8MM

## (undated) DEVICE — CATH JACKY RADIAL 5FR 100CM

## (undated) DEVICE — GUIDEWIRE RUNTHROUGH EF 180CM

## (undated) DEVICE — SPIKE CONTRAST CONTROLLER

## (undated) DEVICE — INFLATOR ENCORE 26 BLLN INFL

## (undated) DEVICE — CATH DXTERITY PG145 110CM 6FR

## (undated) DEVICE — SEE MEDLINE ITEM 156894

## (undated) DEVICE — COVER BAND BAG 40 X 40

## (undated) DEVICE — PAD DEFIB CADENCE ADULT R2

## (undated) DEVICE — KIT GLIDESHEATH SLEND 6FR 10CM